# Patient Record
Sex: MALE | Race: WHITE | NOT HISPANIC OR LATINO | Employment: FULL TIME | URBAN - METROPOLITAN AREA
[De-identification: names, ages, dates, MRNs, and addresses within clinical notes are randomized per-mention and may not be internally consistent; named-entity substitution may affect disease eponyms.]

---

## 2017-01-13 ENCOUNTER — OFFICE VISIT (OUTPATIENT)
Dept: FAMILY MEDICINE | Facility: CLINIC | Age: 47
End: 2017-01-13
Payer: COMMERCIAL

## 2017-01-13 VITALS
BODY MASS INDEX: 37.44 KG/M2 | RESPIRATION RATE: 15 BRPM | SYSTOLIC BLOOD PRESSURE: 111 MMHG | OXYGEN SATURATION: 97 % | HEART RATE: 70 BPM | WEIGHT: 225 LBS | DIASTOLIC BLOOD PRESSURE: 74 MMHG | TEMPERATURE: 97 F

## 2017-01-13 DIAGNOSIS — M54.50 ACUTE RIGHT-SIDED LOW BACK PAIN WITHOUT SCIATICA: Primary | ICD-10-CM

## 2017-01-13 LAB

## 2017-01-13 PROCEDURE — 99213 OFFICE O/P EST LOW 20 MIN: CPT | Performed by: NURSE PRACTITIONER

## 2017-01-13 PROCEDURE — 81001 URINALYSIS AUTO W/SCOPE: CPT | Performed by: NURSE PRACTITIONER

## 2017-01-13 RX ORDER — LISINOPRIL 2.5 MG/1
2.5 TABLET ORAL
COMMUNITY
Start: 2016-08-14 | End: 2018-05-02

## 2017-01-13 RX ORDER — NITROGLYCERIN 0.4 MG/1
0.4 TABLET SUBLINGUAL
COMMUNITY
Start: 2016-08-14

## 2017-01-13 RX ORDER — METHOCARBAMOL 750 MG/1
750 TABLET, FILM COATED ORAL 3 TIMES DAILY PRN
Qty: 30 TABLET | Refills: 0 | Status: SHIPPED | OUTPATIENT
Start: 2017-01-13 | End: 2017-05-11

## 2017-01-13 RX ORDER — CLOPIDOGREL BISULFATE 75 MG/1
TABLET ORAL
COMMUNITY
Start: 2017-01-12 | End: 2017-12-27

## 2017-01-13 RX ORDER — ATORVASTATIN CALCIUM 40 MG/1
TABLET, FILM COATED ORAL
COMMUNITY
Start: 2017-01-12

## 2017-01-13 RX ORDER — CARVEDILOL 6.25 MG/1
TABLET ORAL
COMMUNITY
Start: 2017-01-12 | End: 2017-12-27

## 2017-01-13 RX ORDER — ASPIRIN 81 MG/1
81 TABLET, CHEWABLE ORAL
COMMUNITY
Start: 2016-08-14

## 2017-01-13 ASSESSMENT — PAIN SCALES - GENERAL: PAINLEVEL: MODERATE PAIN (5)

## 2017-01-13 NOTE — PATIENT INSTRUCTIONS
Relieving Back Pain  Back pain is a common problem. You can strain back muscles by lifting too much weight or just by moving the wrong way. Back strain can be uncomfortable, even painful. And it can take weeks or months to improve. To help yourself feel better and prevent future back strains, try these tips.  Important Note: Do not give aspirin to children or teens without first discussing it with your healthcare provider.     Ice    Ice reduces muscle pain and swelling. It helps most during the first 24 to 48 hours after an injury.    Wrap an ice pack or a bag of frozen peas in a thin towel. (Never place ice directly on your skin.)    Place the ice where your back hurts the most.    Don t ice for more than 20 minutes at a time.    You can use ice several times a day.     Medicines  Over-the-counter pain relievers can include acetaminophen and anti-inflammatory medicines, which includes aspirin or ibuprofen. They can help ease discomfort. Some also reduce swelling.    Tell your healthcare provider about any medicines you are already taking.    Take medicines only as directed.    Heat  After the first 48 hours, heat can relax sore muscles and improve blood flow.    Try a warm bath or shower. Or use a heating pad set on low. To prevent a burn, keep a cloth between you and the heating pad.    Don t use a heating pad for more than 15 minutes at a time. Never sleep on a heating pad.    2553-5693 [employer]. [address] [place] [address], [place] [address]. All rights reserved. This information is not intended as a substitute for professional medical care. Always follow your healthcare professional's instructions.

## 2017-01-13 NOTE — PROGRESS NOTES
SUBJECTIVE:                                                    Naren Berger is a 46 year old male who presents to clinic today for the following health issues:    Back Pain      Duration: 2 months        Specific cause: none    Description:   Location of pain: low back right and hip right  Character of pain: dull ache, stabbing and gnawing  Pain radiation:radiates into the right leg  New numbness or weakness in legs, not attributed to pain:  no     Intensity: Currently 5/10    History:   Pain interferes with job: YES,   History of back problems: recurrent self limited episodes of low back pain in the past  Any previous MRI or X-rays: None  Sees a specialist for back pain:  No  Therapies tried without relief: chiropractor    Alleviating factors:   Improved by: none    Precipitating factors:  Worsened by: Lifting, Bending, Standing, Sitting and Walking    Functional and Psychosocial Screen (Sita STarT Back):                  Problem list and histories reviewed & adjusted, as indicated.  Additional history: as documented    Problem list, Medication list, Allergies, and Medical/Social/Surgical histories reviewed in EPIC and updated as appropriate.    ROS:  Constitutional, HEENT, cardiovascular, pulmonary, gi and gu systems are negative, except as otherwise noted.    OBJECTIVE:                                                    /74 mmHg  Pulse 70  Temp(Src) 97  F (36.1  C) (Oral)  Resp 15  Wt 225 lb (102.059 kg)  SpO2 97%  Body mass index is 37.44 kg/(m^2).  GENERAL: healthy, alert, well nourished, well hydrated, no distress  RESP: lungs clear to auscultation - no rales, no rhonchi, no wheezes  CV: regular rates and rhythm, normal S1 S2, no S3 or S4 and no murmur, no click or rub -  ABDOMEN: soft, no tenderness, no  hepatosplenomegaly, no masses, normal bowel sounds  Lumber/Thoracic Spine Exam: Tender: right of spine  Range of Motion:  full range of motion raz lower extremities    Strength:  5/5 raz  lower extremities    Special tests:  negative straight leg raises  Hip Exam: Hip ROM full       ASSESSMENT/PLAN:                                                      1. Acute right-sided low back pain without sciatica  - methocarbamol (ROBAXIN) 750 MG tablet; Take 1 tablet (750 mg) by mouth 3 times daily as needed for muscle spasms  Dispense: 30 tablet; Refill: 0    See Patient Instructions  Patient Instructions       Relieving Back Pain  Back pain is a common problem. You can strain back muscles by lifting too much weight or just by moving the wrong way. Back strain can be uncomfortable, even painful. And it can take weeks or months to improve. To help yourself feel better and prevent future back strains, try these tips.  Important Note: Do not give aspirin to children or teens without first discussing it with your healthcare provider.     Ice    Ice reduces muscle pain and swelling. It helps most during the first 24 to 48 hours after an injury.    Wrap an ice pack or a bag of frozen peas in a thin towel. (Never place ice directly on your skin.)    Place the ice where your back hurts the most.    Don t ice for more than 20 minutes at a time.    You can use ice several times a day.     Medicines  Over-the-counter pain relievers can include acetaminophen and anti-inflammatory medicines, which includes aspirin or ibuprofen. They can help ease discomfort. Some also reduce swelling.    Tell your healthcare provider about any medicines you are already taking.    Take medicines only as directed.    Heat  After the first 48 hours, heat can relax sore muscles and improve blood flow.    Try a warm bath or shower. Or use a heating pad set on low. To prevent a burn, keep a cloth between you and the heating pad.    Don t use a heating pad for more than 15 minutes at a time. Never sleep on a heating pad.    2601-3703 [employer]. [address] [place] [address], [place] [address]. All rights reserved. This information is  not intended as a substitute for professional medical care. Always follow your healthcare professional's instructions.              GEORGINA Cabral [facility]

## 2017-01-13 NOTE — NURSING NOTE
"Chief Complaint   Patient presents with     Back Pain     right lower back pain       Initial /74 mmHg  Pulse 70  Temp(Src) 97  F (36.1  C) (Oral)  Resp 15  Wt 225 lb (102.059 kg)  SpO2 97% Estimated body mass index is 37.44 kg/(m^2) as calculated from the following:    Height as of 7/27/16: 5' 5\" (1.651 m).    Weight as of this encounter: 225 lb (102.059 kg).  BP completed using cuff size: gely Bradley MA      "

## 2017-01-13 NOTE — MR AVS SNAPSHOT
After Visit Summary   1/13/2017    Naren Berger    MRN: [id number]           Patient Information     Date Of Birth          1970        Visit Information        Provider Department      1/13/2017 2:00 PM Marium Bowen APRN [facility]        Today's Diagnoses     Acute right-sided low back pain without sciatica    -  1       Care Instructions      Relieving Back Pain  Back pain is a common problem. You can strain back muscles by lifting too much weight or just by moving the wrong way. Back strain can be uncomfortable, even painful. And it can take weeks or months to improve. To help yourself feel better and prevent future back strains, try these tips.  Important Note: Do not give aspirin to children or teens without first discussing it with your healthcare provider.     Ice    Ice reduces muscle pain and swelling. It helps most during the first 24 to 48 hours after an injury.    Wrap an ice pack or a bag of frozen peas in a thin towel. (Never place ice directly on your skin.)    Place the ice where your back hurts the most.    Don t ice for more than 20 minutes at a time.    You can use ice several times a day.     Medicines  Over-the-counter pain relievers can include acetaminophen and anti-inflammatory medicines, which includes aspirin or ibuprofen. They can help ease discomfort. Some also reduce swelling.    Tell your healthcare provider about any medicines you are already taking.    Take medicines only as directed.    Heat  After the first 48 hours, heat can relax sore muscles and improve blood flow.    Try a warm bath or shower. Or use a heating pad set on low. To prevent a burn, keep a cloth between you and the heating pad.    Don t use a heating pad for more than 15 minutes at a time. Never sleep on a heating pad.    8131-7082 [employer]. [address] [place] [address], [place] [address]. All rights reserved. This information is not intended as a substitute  "for professional medical care. Always follow your healthcare professional's instructions.              Follow-ups after your visit        Who to contact     If you have questions or need follow up information about today's clinic visit or your schedule please contact [facility] directly at [phone].  Normal or non-critical lab and imaging results will be communicated to you by MyChart, letter or phone within 4 business days after the clinic has received the results. If you do not hear from us within 7 days, please contact the clinic through MyChart or phone. If you have a critical or abnormal lab result, we will notify you by phone as soon as possible.  Submit refill requests through [employer] or call your pharmacy and they will forward the refill request to us. Please allow 3 business days for your refill to be completed.          Additional Information About Your Visit        MyChart Information     [employer] lets you send messages to your doctor, view your test results, renew your prescriptions, schedule appointments and more. To sign up, go to [email or web address] . Click on \"Log in\" on the left side of the screen, which will take you to the Welcome page. Then click on \"Sign up Now\" on the right side of the page.     You will be asked to enter the access code listed below, as well as some personal information. Please follow the directions to create your username and password.     Your access code is: [email or web address]  Expires: 2017  3:17 PM     Your access code will  in 90 days. If you need help or a new code, please call your [facility] or [phone].        Care EveryWhere ID     This is your Care EveryWhere ID. This could be used by other organizations to access your [facility] medical records  [id number]        Your Vitals Were     Pulse Temperature Respirations Pulse Oximetry          70 97  F (36.1  C) (Oral) 15 97%         Blood Pressure from Last 3 Encounters:   17 " 111/74   10/09/15 138/88   08/03/15 119/73    Weight from Last 3 Encounters:   01/13/17 225 lb (102.059 kg)   07/27/16 218 lb (98.884 kg)   11/12/15 207 lb (93.895 kg)              We Performed the Following     *UA reflex to Microscopic and Culture ([facility] (except [place] and [place])     Urine Microscopic          Today's Medication Changes          These changes are accurate as of: 1/13/17  3:17 PM.  If you have any questions, ask your nurse or doctor.               Start taking these medicines.        Dose/Directions    methocarbamol 750 MG tablet   Commonly known as:  ROBAXIN   Used for:  Acute right-sided low back pain without sciatica   Started by:  Marium Bowen APRN CNP        Dose:  750 mg   Take 1 tablet (750 mg) by mouth 3 times daily as needed for muscle spasms   Quantity:  30 tablet   Refills:  0            Where to get your medicines      These medications were sent to [place] [address] [place] [facility] [address] [facility]  [address] [facility] [address]     Phone:  [phone]    - methocarbamol 750 MG tablet             Primary Care Provider Office Phone # Fax #    Kristen M Kehr, PA-C [phone]       [facility] [address] [facility] [address]        Thank you!     Thank you for choosing [facility]  for your care. Our goal is always to provide you with excellent care. Hearing back from our patients is one way we can continue to improve our services. Please take a few minutes to complete the written survey that you may receive in the mail after your visit with us. Thank you!             Your Updated Medication List - Protect others around you: Learn how to safely use, store and throw away your medicines at www.disposemymeds.org.          This list is accurate as of: 1/13/17  3:17 PM.  Always use your most recent med list.                   Brand Name Dispense Instructions for use    aspirin  81 MG chewable tablet      81 mg       atorvastatin 40 MG tablet    LIPITOR         carvedilol 6.25 MG tablet    COREG         clopidogrel 75 MG tablet    PLAVIX         cyclobenzaprine 10 MG tablet    FLEXERIL    30 tablet    Take 1 tablet (10 mg) by mouth 3 times daily as needed for muscle spasms       lisinopril 2.5 MG tablet    PRINIVIL/Zestril     Take 2.5 mg by mouth       methocarbamol 750 MG tablet    ROBAXIN    30 tablet    Take 1 tablet (750 mg) by mouth 3 times daily as needed for muscle spasms       NITROSTAT 0.4 MG sublingual tablet   Generic drug:  nitroglycerin      Place 0.4 mg under the tongue       NO ACTIVE MEDICATIONS      .

## 2017-04-14 ENCOUNTER — TRANSFERRED RECORDS (OUTPATIENT)
Dept: HEALTH INFORMATION MANAGEMENT | Facility: CLINIC | Age: 47
End: 2017-04-14

## 2017-05-11 DIAGNOSIS — M54.50 ACUTE RIGHT-SIDED LOW BACK PAIN WITHOUT SCIATICA: ICD-10-CM

## 2017-05-11 RX ORDER — METHOCARBAMOL 750 MG/1
750 TABLET, FILM COATED ORAL 3 TIMES DAILY PRN
Qty: 30 TABLET | Refills: 0 | Status: SHIPPED | OUTPATIENT
Start: 2017-05-11 | End: 2021-04-20

## 2017-07-19 NOTE — PROGRESS NOTES
[facility]  [address] [facility] [address]  [phone]  Dept: [phone]    PRE-OP EVALUATION:  Today's date: 2017    Naren Berger (: 1970) presents for pre-operative evaluation assessment as requested by Dr. Oracio Lema.  He requires evaluation and anesthesia risk assessment prior to undergoing surgery/procedure for treatment of bilateral bilateral carpal tunnel and trigger release. Right hand then left hand a couple weeks later .  Proposed procedure: carpal tunnel release     Date of Surgery/ Procedure: 17  Time of Surgery/ Procedure: unknown   Hospital/Surgical Facility: [facility]  Fax number for surgical facility:   Primary Physician: Richa West [facility]  Type of Anesthesia Anticipated: General      1. YES - Do you have a history of heart attack, stroke, stent, bypass or surgery on an artery in the head, neck, heart or legs?  2. NO - Do you ever have any pain or discomfort in your chest?  3. NO - Do you have a history of  Heart Failure?  4. NO - Are you troubled by shortness of breath when: walking on the level, up a slight hill or at night?  5. NO - Do you currently have a cold, bronchitis or other respiratory infection?  6. NO - Do you have a cough, shortness of breath or wheezing?  7. NO - Do you sometimes get pains in the calves of your legs when you walk?  8. NO - Do you or anyone in your family have previous history of blood clots?  9. NO - Do you or does anyone in your family have a serious bleeding problem such as prolonged bleeding following surgeries or cuts?  10. NO - Have you ever had problems with anemia or been told to take iron pills?  11. NO - Have you had any abnormal blood loss such as black, tarry or bloody stools, or abnormal vaginal bleeding?  12. NO - Have you ever had a blood transfusion?  13. YES - Have you or any of your relatives ever had problems with anesthesia?  14. NO - Do you have sleep apnea, excessive snoring  or daytime drowsiness?  15. NO - Do you have any prosthetic heart valves?  16. NO - Do you have prosthetic joints?  17. NO - Is there any chance that you may be pregnant?        HPI:                                                      Brief HPI related to upcoming procedure: history of numbness in left hand over 6 months.     See problem list for active medical problems.  Problems all longstanding and stable, except as noted/documented.  See ROS for pertinent symptoms related to these conditions.                                                                                                  .    MEDICAL HISTORY:                                                    Patient Active Problem List    Diagnosis Date Noted     Coronary artery disease involving native heart with angina pectoris, unspecified vessel or lesion type (H) 07/20/2017     Priority: Medium     Bilateral carpal tunnel syndrome 07/20/2017     Priority: Medium     Bilateral low back pain without sciatica 10/09/2015     Priority: Medium     Trigger thumb of left hand 07/22/2013     Priority: Medium     CARDIOVASCULAR SCREENING; LDL GOAL LESS THAN 160 10/31/2010     Priority: Medium      Past Medical History:   Diagnosis Date     NO ACTIVE PROBLEMS      Past Surgical History:   Procedure Laterality Date     C ANESTH,ARTHROSCOP PROC,ANKLE &/OR FOOT      right     PVD STENTING  08/2016     Current Outpatient Prescriptions   Medication Sig Dispense Refill     carvedilol (COREG) 6.25 MG tablet        aspirin 81 MG chewable tablet 81 mg       lisinopril (PRINIVIL/ZESTRIL) 2.5 MG tablet Take 2.5 mg by mouth       clopidogrel (PLAVIX) 75 MG tablet        nitroglycerin (NITROSTAT) 0.4 MG sublingual tablet Place 0.4 mg under the tongue       atorvastatin (LIPITOR) 40 MG tablet        methocarbamol (ROBAXIN) 750 MG tablet Take 1 tablet (750 mg) by mouth 3 times daily as needed for muscle spasms (Patient not taking: Reported on 7/20/2017) 30 tablet 0     OTC  "products: None, except as noted above    Allergies   Allergen Reactions     No Known Allergies       Latex Allergy: NO    Social History   Substance Use Topics     Smoking status: Former Smoker     Packs/day: 0.50     Years: 20.00     Types: Cigarettes     Smokeless tobacco: Never Used     Alcohol use No     History   Drug Use No       REVIEW OF SYSTEMS:                                                    C: NEGATIVE for fever, chills, change in weight  I: NEGATIVE for worrisome rashes, moles or lesions  E: NEGATIVE for vision changes or irritation  E/M: NEGATIVE for ear, mouth and throat problems  R: NEGATIVE for significant cough or SOB  B: NEGATIVE for masses, tenderness or discharge  CV: NEGATIVE for chest pain, palpitations or peripheral edema  GI: NEGATIVE for nausea, abdominal pain, heartburn, or change in bowel habits  : NEGATIVE for frequency, dysuria, or hematuria  M: NEGATIVE for significant arthralgias or myalgia  N: NEGATIVE for weakness, dizziness or paresthesias  E: NEGATIVE for temperature intolerance, skin/hair changes  H: NEGATIVE for bleeding problems  P: NEGATIVE for changes in mood or affect    EXAM:                                                    /79  Pulse 78  Ht 5' 5\" (1.651 m)  Wt 225 lb (102.1 kg)  SpO2 96%  BMI 37.44 kg/m2    GENERAL APPEARANCE: healthy, alert and no distress     EYES: EOMI,  PERRL     HENT: ear canals and TM's normal and nose and mouth without ulcers or lesions     NECK: no adenopathy, no asymmetry, masses, or scars and thyroid normal to palpation     RESP: lungs clear to auscultation - no rales, rhonchi or wheezes     CV: regular rates and rhythm, normal S1 S2, no S3 or S4 and no murmur, click or rub     ABDOMEN:  soft, nontender, no HSM or masses and bowel sounds normal     MS: extremities normal- no gross deformities noted, no evidence of inflammation in joints, FROM in all extremities.     SKIN: no suspicious lesions or rashes     NEURO: Normal strength " and tone, sensory exam grossly normal, mentation intact and speech normal     PSYCH: mentation appears normal. and affect normal/bright     LYMPHATICS: No axillary, cervical, or supraclavicular nodes    DIAGNOSTICS:                                                      Labs Drawn and in Process:   Unresulted Labs Ordered in the Past 30 Days of this Admission     Date and Time Order Name Status Description    7/20/2017 1345 BASIC METABOLIC PANEL In process           No results for input(s): HGB, PLT, INR, NA, POTASSIUM, CR, A1C in the last 25632 hours.     IMPRESSION:                                                    Reason for surgery/procedure: Right hand then left hand a couple weeks later .  Proposed procedure: carpal tunnel release     The proposed surgical procedure is considered LOW risk.    REVISED CARDIAC RISK INDEX  The patient has the following serious cardiovascular risks for perioperative complications such as (MI, PE, VFib and 3  AV Block):  Coronary Artery Disease (MI, positive stress test, angina, Qs on EKG)      The patient has the following additional risks for perioperative complications:  No identified additional risks      ICD-10-CM    1. Preop general physical exam Z01.818 Hemoglobin     Basic metabolic panel   2. Coronary artery disease involving native heart with angina pectoris, unspecified vessel or lesion type (H) I25.119    3. Bilateral carpal tunnel syndrome G56.03        RECOMMENDATIONS:                                                        APPROVAL GIVEN to proceed with proposed procedure, without further diagnostic evaluation       Signed Electronically by: Louis Colon PA-C    Copy of this evaluation report is provided to requesting physician.    [facility] Preop Guidelines  I agree with the above plan  Oracio Fletcher MD

## 2017-07-20 ENCOUNTER — OFFICE VISIT (OUTPATIENT)
Dept: FAMILY MEDICINE | Facility: CLINIC | Age: 47
End: 2017-07-20
Payer: COMMERCIAL

## 2017-07-20 VITALS
HEIGHT: 65 IN | DIASTOLIC BLOOD PRESSURE: 79 MMHG | WEIGHT: 225 LBS | SYSTOLIC BLOOD PRESSURE: 118 MMHG | BODY MASS INDEX: 37.49 KG/M2 | OXYGEN SATURATION: 96 % | HEART RATE: 78 BPM

## 2017-07-20 DIAGNOSIS — G56.03 BILATERAL CARPAL TUNNEL SYNDROME: ICD-10-CM

## 2017-07-20 DIAGNOSIS — Z01.818 PREOP GENERAL PHYSICAL EXAM: Primary | ICD-10-CM

## 2017-07-20 DIAGNOSIS — I25.119 CORONARY ARTERY DISEASE INVOLVING NATIVE HEART WITH ANGINA PECTORIS, UNSPECIFIED VESSEL OR LESION TYPE (H): ICD-10-CM

## 2017-07-20 PROBLEM — G56.02 CARPAL TUNNEL SYNDROME OF LEFT WRIST: Status: ACTIVE | Noted: 2017-07-20

## 2017-07-20 PROBLEM — G56.02 CARPAL TUNNEL SYNDROME OF LEFT WRIST: Status: RESOLVED | Noted: 2017-07-20 | Resolved: 2017-07-20

## 2017-07-20 LAB
ANION GAP SERPL CALCULATED.3IONS-SCNC: 7 MMOL/L (ref 3–14)
BUN SERPL-MCNC: 7 MG/DL (ref 7–30)
CALCIUM SERPL-MCNC: 8.9 MG/DL (ref 8.5–10.1)
CHLORIDE SERPL-SCNC: 104 MMOL/L (ref 94–109)
CO2 SERPL-SCNC: 28 MMOL/L (ref 20–32)
CREAT SERPL-MCNC: 0.85 MG/DL (ref 0.66–1.25)
GFR SERPL CREATININE-BSD FRML MDRD: ABNORMAL ML/MIN/1.7M2
GLUCOSE SERPL-MCNC: 108 MG/DL (ref 70–99)
HGB BLD-MCNC: 14.8 G/DL (ref 13.3–17.7)
POTASSIUM SERPL-SCNC: 3.6 MMOL/L (ref 3.4–5.3)
SODIUM SERPL-SCNC: 139 MMOL/L (ref 133–144)

## 2017-07-20 PROCEDURE — 85018 HEMOGLOBIN: CPT | Performed by: PHYSICIAN ASSISTANT

## 2017-07-20 PROCEDURE — 99214 OFFICE O/P EST MOD 30 MIN: CPT | Performed by: PHYSICIAN ASSISTANT

## 2017-07-20 PROCEDURE — 36415 COLL VENOUS BLD VENIPUNCTURE: CPT | Performed by: PHYSICIAN ASSISTANT

## 2017-07-20 PROCEDURE — 80048 BASIC METABOLIC PNL TOTAL CA: CPT | Performed by: PHYSICIAN ASSISTANT

## 2017-07-20 NOTE — LETTER
[facility]  [address] [facility] [address] [phone]        July 21, 2017    Naren Berger  [address] [facility] [address]            Mr. Berger,     All of your labs were normal for you.     Please contact the clinic if you have additional questions.  Thank you.     Sincerely,     Louis Colon PA-C/ks     Results for orders placed or performed in visit on 07/20/17   Hemoglobin   Result Value Ref Range    Hemoglobin 14.8 13.3 - 17.7 g/dL   Basic metabolic panel   Result Value Ref Range    Sodium 139 133 - 144 mmol/L    Potassium 3.6 3.4 - 5.3 mmol/L    Chloride 104 94 - 109 mmol/L    Carbon Dioxide 28 20 - 32 mmol/L    Anion Gap 7 3 - 14 mmol/L    Glucose 108 (H) 70 - 99 mg/dL    Urea Nitrogen 7 7 - 30 mg/dL    Creatinine 0.85 0.66 - 1.25 mg/dL    GFR Estimate >90  Non  GFR Calc   >60 mL/min/1.7m2    GFR Estimate If Black >90   GFR Calc   >60 mL/min/1.7m2    Calcium 8.9 8.5 - 10.1 mg/dL

## 2017-07-20 NOTE — NURSING NOTE
"Chief Complaint   Patient presents with     Pre-Op Exam       Initial /79  Pulse 78  Ht 5' 5\" (1.651 m)  Wt 225 lb (102.1 kg)  SpO2 96%  BMI 37.44 kg/m2 Estimated body mass index is 37.44 kg/(m^2) as calculated from the following:    Height as of this encounter: 5' 5\" (1.651 m).    Weight as of this encounter: 225 lb (102.1 kg).  Medication Reconciliation: complete  Елена Pantoja CMA    "

## 2017-07-20 NOTE — MR AVS SNAPSHOT
After Visit Summary   7/20/2017    Naren Berger    MRN: [id number]           Patient Information     Date Of Birth          1970        Visit Information        Provider Department      7/20/2017 1:30 PM Louis Colon PA-C [facility]        Today's Diagnoses     Preop general physical exam    -  1    Coronary artery disease involving native heart with angina pectoris, unspecified vessel or lesion type (H)        Bilateral carpal tunnel syndrome          Care Instructions      Before Your Surgery      Call your surgeon if there is any change in your health. This includes signs of a cold or flu (such as a sore throat, runny nose, cough, rash or fever).    Do not smoke, drink alcohol or take over the counter medicine (unless your surgeon or primary care doctor tells you to) for the 24 hours before and after surgery.    If you take prescribed drugs: Follow your doctor s orders about which medicines to take and which to stop until after surgery.    Eating and drinking prior to surgery: follow the instructions from your surgeon    Take a shower or bath the night before surgery. Use the soap your surgeon gave you to gently clean your skin. If you do not have soap from your surgeon, use your regular soap. Do not shave or scrub the surgery site.  Wear clean pajamas and have clean sheets on your bed.           Follow-ups after your visit        Who to contact     If you have questions or need follow up information about today's clinic visit or your schedule please contact [facility] directly at [phone].  Normal or non-critical lab and imaging results will be communicated to you by MyChart, letter or phone within 4 business days after the clinic has received the results. If you do not hear from us within 7 days, please contact the clinic through MyChart or phone. If you have a critical or abnormal lab result, we will notify you by phone as soon as  "possible.  Submit refill requests through [employer] or call your pharmacy and they will forward the refill request to us. Please allow 3 business days for your refill to be completed.          Additional Information About Your Visit        [employer] Information     Nvigen lets you send messages to your doctor, view your test results, renew your prescriptions, schedule appointments and more. To sign up, go to [email or web address] . Click on \"Log in\" on the left side of the screen, which will take you to the Welcome page. Then click on \"Sign up Now\" on the right side of the page.     You will be asked to enter the access code listed below, as well as some personal information. Please follow the directions to create your username and password.     Your access code is: [email or web address]  Expires: 10/18/2017  2:00 PM     Your access code will  in 90 days. If you need help or a new code, please call your [facility] clinic or [phone].        Care EveryWhere ID     This is your Care EveryWhere ID. This could be used by other organizations to access your [facility] medical records  [id number]        Your Vitals Were     Pulse Height Pulse Oximetry BMI (Body Mass Index)          78 5' 5\" (1.651 m) 96% 37.44 kg/m2         Blood Pressure from Last 3 Encounters:   17 118/79   17 111/74   10/09/15 138/88    Weight from Last 3 Encounters:   17 225 lb (102.1 kg)   17 225 lb (102.1 kg)   16 218 lb (98.9 kg)              We Performed the Following     Basic metabolic panel     Hemoglobin        Primary Care Provider    Md Other Clinic                Equal Access to Services     [facility]: Hadii heidi Goins, sj riley, qabacilio marcus . So [facility] [phone].    ATENCIÓN: Si habla español, tiene a salgado disposición servicios gratuitos de asistencia lingüística. Beto al [phone].    We comply with applicable federal civil rights " laws and [place] laws. We do not discriminate on the basis of race, color, national origin, age, disability sex, sexual orientation or gender identity.            Thank you!     [facility] you for [facility]  [facility]. Our goal is always to provide you with excellent care. Hearing back from our patients is one way we can continue to improve our services. Please take a few minutes to complete the written survey that you may receive in the mail after your visit with us. Thank you!             Your Updated Medication List - Protect others around you: Learn how to safely use, store and throw away your medicines at www.disposemymeds.org.          This list is accurate as of: 7/20/17  2:00 PM.  Always use your most recent med list.                   Brand Name Dispense Instructions for use Diagnosis    aspirin 81 MG chewable tablet      81 mg        atorvastatin 40 MG tablet    LIPITOR          carvedilol 6.25 MG tablet    COREG          clopidogrel 75 MG tablet    PLAVIX          lisinopril 2.5 MG tablet    PRINIVIL/Zestril     Take 2.5 mg by mouth        methocarbamol 750 MG tablet    ROBAXIN    30 tablet    Take 1 tablet (750 mg) by mouth 3 times daily as needed for muscle spasms    Acute right-sided low back pain without sciatica       NITROSTAT 0.4 MG sublingual tablet   Generic drug:  nitroGLYcerin      Place 0.4 mg under the tongue

## 2017-07-21 NOTE — PROGRESS NOTES
MrJuany Berger,    All of your labs were normal for you.    Please contact the clinic if you have additional questions.  Thank you.    Sincerely,    Louis Colon PA-C

## 2017-12-27 ENCOUNTER — OFFICE VISIT (OUTPATIENT)
Dept: FAMILY MEDICINE | Facility: CLINIC | Age: 47
End: 2017-12-27
Payer: COMMERCIAL

## 2017-12-27 VITALS
WEIGHT: 230 LBS | DIASTOLIC BLOOD PRESSURE: 84 MMHG | SYSTOLIC BLOOD PRESSURE: 127 MMHG | HEART RATE: 93 BPM | HEIGHT: 65 IN | OXYGEN SATURATION: 98 % | BODY MASS INDEX: 38.32 KG/M2

## 2017-12-27 DIAGNOSIS — L91.8 SKIN TAG: Primary | ICD-10-CM

## 2017-12-27 DIAGNOSIS — I25.119 CORONARY ARTERY DISEASE INVOLVING NATIVE HEART WITH ANGINA PECTORIS, UNSPECIFIED VESSEL OR LESION TYPE (H): ICD-10-CM

## 2017-12-27 DIAGNOSIS — N52.9 ERECTILE DYSFUNCTION, UNSPECIFIED ERECTILE DYSFUNCTION TYPE: ICD-10-CM

## 2017-12-27 DIAGNOSIS — Z12.11 SPECIAL SCREENING FOR MALIGNANT NEOPLASMS, COLON: ICD-10-CM

## 2017-12-27 PROCEDURE — 11200 RMVL SKIN TAGS UP TO&INC 15: CPT | Performed by: PHYSICIAN ASSISTANT

## 2017-12-27 PROCEDURE — 99213 OFFICE O/P EST LOW 20 MIN: CPT | Mod: 25 | Performed by: PHYSICIAN ASSISTANT

## 2017-12-27 RX ORDER — SILDENAFIL CITRATE 20 MG/1
20-100 TABLET ORAL DAILY
Qty: 30 TABLET | Refills: 0 | Status: SHIPPED | OUTPATIENT
Start: 2017-12-27 | End: 2018-05-09

## 2017-12-27 RX ORDER — CARVEDILOL 6.25 MG/1
6.25 TABLET ORAL 2 TIMES DAILY WITH MEALS
Qty: 60 TABLET | COMMUNITY
Start: 2017-12-27 | End: 2018-05-02

## 2017-12-27 NOTE — MR AVS SNAPSHOT
After Visit Summary   12/27/2017    Naren Berger    MRN: [id number]           Patient Information     Date Of Birth          1970        Visit Information        Provider Department      12/27/2017 9:40 AM Louis Colon PA-C [facility] [place]        Today's Diagnoses     Skin tag    -  1    Coronary artery disease involving native heart with angina pectoris, unspecified vessel or lesion type (H)        Special screening for malignant neoplasms, colon           Follow-ups after your visit        Additional Services     GASTROENTEROLOGY ADULT REF PROCEDURE ONLY       Last Lab Result: Creatinine (mg/dL)       Date                     Value                 07/20/2017               0.85             ----------  Body mass index is 38.27 kg/(m^2).      Patient will be contacted to schedule procedure.     Please be aware that coverage of these services is subject to the terms and limitations of your health insurance plan.  Call member services at your health plan with any benefit or coverage questions.  Any procedures must be performed at a [place] facility OR coordinated by your clinic's referral office.    Please bring the following with you to your appointment:    (1) Any X-Rays, CTs or MRIs which have been performed.  Contact the facility where they were done to arrange for  prior to your scheduled appointment.    (2) List of current medications   (3) This referral request   (4) Any documents/labs given to you for this referral                  Future tests that were ordered for you today     Open Future Orders        Priority Expected Expires Ordered    Lipid panel reflex to direct LDL Fasting Routine  6/30/2018 12/27/2017            Who to contact     If you have questions or need follow up information about today's clinic visit or your schedule please contact [facility] directly at [phone].  Normal or non-critical lab and imaging results will be  "communicated to you by MyChart, letter or phone within 4 business days after the clinic has received the results. If you do not hear from us within 7 days, please contact the clinic through [employer] or phone. If you have a critical or abnormal lab result, we will notify you by phone as soon as possible.  Submit refill requests through [employer] or call your pharmacy and they will forward the refill request to us. Please allow 3 business days for your refill to be completed.          Additional Information About Your Visit        [employer] Information     [employer] lets you send messages to your doctor, view your test results, renew your prescriptions, schedule appointments and more. To sign up, go to [email or web address] . Click on \"Log in\" on the left side of the screen, which will take you to the Welcome page. Then click on \"Sign up Now\" on the right side of the page.     You will be asked to enter the access code listed below, as well as some personal information. Please follow the directions to create your username and password.     Your access code is: [email or web address]  Expires: 3/27/2018 10:09 AM     Your access code will  in 90 days. If you need help or a new code, please call your [facility] clinic or [phone].        Care EveryWhere ID     This is your Care EveryWhere ID. This could be used by other organizations to access your [place] medical records  [id number]        Your Vitals Were     Pulse Height Pulse Oximetry BMI (Body Mass Index)          93 5' 5\" (1.651 m) 98% 38.27 kg/m2         Blood Pressure from Last 3 Encounters:   17 127/84   17 118/79   17 111/74    Weight from Last 3 Encounters:   17 230 lb (104.3 kg)   17 225 lb (102.1 kg)   17 225 lb (102.1 kg)              We Performed the Following     GASTROENTEROLOGY ADULT REF PROCEDURE ONLY     REMOVAL OF SKIN TAGS, FIRST 15        Primary Care Provider Fax #    Physician No Ref-Primary [phone]       No " address on file        Equal Access to Services     [facility] : Hadii aad ku juanjo Goins, waave luhaylee, qalefty victor jerrymegannina, bacilio lacey jacobsgregoriasussy magallon. So [facility] [phone].    ATENCIÓN: Si habla español, tiene a salgado disposición servicios gratuitos de asistencia lingüística. Llame al [phone].    We comply with applicable federal civil rights laws and [place] laws. We do not discriminate on the basis of race, color, national origin, age, disability, sex, sexual orientation, or gender identity.            Thank you!     Thank you for choosing [facility]  [facility] your [facility]. Our goal is always to provide you with excellent care. Hearing back from our patients is one way we can continue to improve our services. Please take a few minutes to complete the written survey that you may receive in the mail after your visit with us. Thank you!             Your Updated Medication List - Protect others around you: Learn how to safely use, store and throw away your medicines at www.disposemymeds.org.          This list is accurate as of: 12/27/17 10:09 AM.  Always use your most recent med list.                   Brand Name Dispense Instructions for use Diagnosis    aspirin 81 MG chewable tablet      81 mg        atorvastatin 40 MG tablet    LIPITOR          lisinopril 2.5 MG tablet    PRINIVIL/Zestril     Take 2.5 mg by mouth        methocarbamol 750 MG tablet    ROBAXIN    30 tablet    Take 1 tablet (750 mg) by mouth 3 times daily as needed for muscle spasms    Acute right-sided low back pain without sciatica       NITROSTAT 0.4 MG sublingual tablet   Generic drug:  nitroGLYcerin      Place 0.4 mg under the tongue

## 2017-12-27 NOTE — NURSING NOTE
"Chief Complaint   Patient presents with     Skin Tags       Initial /84  Pulse 93  Ht 5' 5\" (1.651 m)  Wt 230 lb (104.3 kg)  SpO2 98%  BMI 38.27 kg/m2 Estimated body mass index is 38.27 kg/(m^2) as calculated from the following:    Height as of this encounter: 5' 5\" (1.651 m).    Weight as of this encounter: 230 lb (104.3 kg).  Medication Reconciliation: complete  Елена Pantoja CMA    "

## 2017-12-27 NOTE — PROGRESS NOTES
SUBJECTIVE:                                                    Naren Berger is a 47 year old male who presents to clinic today for the following health issues:    Derm Problem      Duration: unsure    Description (location/character/radiation): left armpit area    Intensity:  mild    Accompanying signs and symptoms: pain at times when catching on clothing    History (similar episodes/previous evaluation): None    Precipitating or alleviating factors: None    Therapies tried and outcome: None   Off and on irritation.       Also history of MI in 8/2016. Had a stint placed. Was on plavix and just had 1 yr check up with cardiology 9/2017. Was taken off plavix and told to follow up  With PCP.     Problem list and histories reviewed & adjusted, as indicated.  Additional history: as documented    Patient Active Problem List   Diagnosis     CARDIOVASCULAR SCREENING; LDL GOAL LESS THAN 160     Trigger thumb of left hand     Bilateral low back pain without sciatica     Coronary artery disease involving native heart with angina pectoris, unspecified vessel or lesion type (H)     Bilateral carpal tunnel syndrome     Skin tag     Erectile dysfunction, unspecified erectile dysfunction type     Past Surgical History:   Procedure Laterality Date     C ANESTH,ARTHROSCOP PROC,ANKLE &/OR FOOT      right     PVD STENTING  08/2016       Social History   Substance Use Topics     Smoking status: Former Smoker     Packs/day: 0.50     Years: 20.00     Types: Cigarettes     Smokeless tobacco: Never Used     Alcohol use No     Family History   Problem Relation Age of Onset     DIABETES Father      KIDNEY DISEASE Father          Current Outpatient Prescriptions   Medication Sig Dispense Refill     carvedilol (COREG) 6.25 MG tablet Take 1 tablet (6.25 mg) by mouth 2 times daily (with meals) 60 tablet      sildenafil (REVATIO) 20 MG tablet Take 1-5 tablets ( mg) by mouth daily Take 1 hr prior to sexual activity. Never use with  "nitroglycerin, terazosin or doxazosin. 30 tablet 0     aspirin 81 MG chewable tablet 81 mg       lisinopril (PRINIVIL/ZESTRIL) 2.5 MG tablet Take 2.5 mg by mouth       nitroglycerin (NITROSTAT) 0.4 MG sublingual tablet Place 0.4 mg under the tongue       atorvastatin (LIPITOR) 40 MG tablet        methocarbamol (ROBAXIN) 750 MG tablet Take 1 tablet (750 mg) by mouth 3 times daily as needed for muscle spasms (Patient not taking: Reported on 12/27/2017) 30 tablet 0     [DISCONTINUED] carvedilol (COREG) 6.25 MG tablet        Allergies   Allergen Reactions     No Known Allergies      BP Readings from Last 3 Encounters:   12/27/17 127/84   07/20/17 118/79   01/13/17 111/74    Wt Readings from Last 3 Encounters:   12/27/17 230 lb (104.3 kg)   07/20/17 225 lb (102.1 kg)   01/13/17 225 lb (102.1 kg)                  Labs reviewed in EPIC        ROS:  Constitutional, HEENT, cardiovascular, pulmonary, gi and gu systems are negative, except as otherwise noted.      OBJECTIVE:   /84  Pulse 93  Ht 5' 5\" (1.651 m)  Wt 230 lb (104.3 kg)  SpO2 98%  BMI 38.27 kg/m2  Body mass index is 38.27 kg/(m^2).  GENERAL: healthy, alert and no distress  SKIN: left axilla: 4 small skin tage    Diagnostic Test Results:  none     ASSESSMENT/PLAN:         ICD-10-CM    1. Skin tag L91.8 REMOVAL OF SKIN TAGS, FIRST 15   2. Coronary artery disease involving native heart with angina pectoris, unspecified vessel or lesion type (H) I25.119 Lipid panel reflex to direct LDL Fasting     carvedilol (COREG) 6.25 MG tablet   3. Erectile dysfunction, unspecified erectile dysfunction type N52.9 sildenafil (REVATIO) 20 MG tablet   4. Special screening for malignant neoplasms, colon Z12.11 GASTROENTEROLOGY ADULT REF PROCEDURE ONLY   1. Skin tags are snipped off using sterile #11. Local anesthesia was not used. These pathognomonic lesions are not sent for pathology.  warning signs discussed.  Wound care discussed.   2. Future fasting labs. Recheck blood " pressure in 6 months        Louis Colon PA-C  [facility]

## 2018-03-10 LAB
CHOLEST SERPL-MCNC: 129 MG/DL (ref 100–199)
HDLC SERPL-MCNC: 40 MG/DL
LDLC SERPL CALC-MCNC: 67 MG/DL
NONHDLC SERPL-MCNC: 89 MG/DL
TRIGL SERPL-MCNC: 112 MG/DL

## 2018-05-02 ENCOUNTER — OFFICE VISIT (OUTPATIENT)
Dept: FAMILY MEDICINE | Facility: CLINIC | Age: 48
End: 2018-05-02
Payer: COMMERCIAL

## 2018-05-02 VITALS
SYSTOLIC BLOOD PRESSURE: 128 MMHG | RESPIRATION RATE: 16 BRPM | WEIGHT: 232 LBS | TEMPERATURE: 98.9 F | HEART RATE: 106 BPM | OXYGEN SATURATION: 96 % | BODY MASS INDEX: 38.61 KG/M2 | DIASTOLIC BLOOD PRESSURE: 78 MMHG

## 2018-05-02 DIAGNOSIS — R07.0 THROAT PAIN: ICD-10-CM

## 2018-05-02 DIAGNOSIS — J02.0 STREP THROAT: Primary | ICD-10-CM

## 2018-05-02 LAB
DEPRECATED S PYO AG THROAT QL EIA: ABNORMAL
SPECIMEN SOURCE: ABNORMAL

## 2018-05-02 PROCEDURE — 99213 OFFICE O/P EST LOW 20 MIN: CPT | Performed by: PHYSICIAN ASSISTANT

## 2018-05-02 PROCEDURE — 87880 STREP A ASSAY W/OPTIC: CPT | Performed by: PHYSICIAN ASSISTANT

## 2018-05-02 RX ORDER — AMOXICILLIN 875 MG
875 TABLET ORAL 2 TIMES DAILY
Qty: 20 TABLET | Refills: 0 | Status: SHIPPED | OUTPATIENT
Start: 2018-05-02 | End: 2018-05-22

## 2018-05-02 ASSESSMENT — PAIN SCALES - GENERAL: PAINLEVEL: MODERATE PAIN (4)

## 2018-05-02 NOTE — NURSING NOTE
"Chief Complaint   Patient presents with     Pharyngitis     Health Maintenance     PHQ2       Initial /78  Pulse 106  Temp 98.9  F (37.2  C) (Oral)  Resp 16  Wt 232 lb (105.2 kg)  SpO2 96%  BMI 38.61 kg/m2 Estimated body mass index is 38.61 kg/(m^2) as calculated from the following:    Height as of 12/27/17: 5' 5\" (1.651 m).    Weight as of this encounter: 232 lb (105.2 kg).  Medication Reconciliation: complete  Елена Pantoja CMA    "

## 2018-05-02 NOTE — MR AVS SNAPSHOT
"              After Visit Summary   2018    Naren Berger    MRN: [id number]           Patient Information     Date Of Birth          1970        Visit Information        Provider Department      2018 9:40 AM Louis Colon PA-C [facility]        Today's Diagnoses     Strep throat    -  1    Throat pain           Follow-ups after your visit        Who to contact     If you have questions or need follow up information about today's clinic visit or your schedule please contact [facility] directly at [phone].  Normal or non-critical lab and imaging results will be communicated to you by [employer], letter or phone within 4 business days after the clinic has received the results. If you do not hear from us within 7 days, please contact the clinic through [employer] or phone. If you have a critical or abnormal lab result, we will notify you by phone as soon as possible.  Submit refill requests through [email or web address] or call your pharmacy and they will forward the refill request to us. Please allow 3 business days for your refill to be completed.          Additional Information About Your Visit        MyChart Information     [email or web address] lets you send messages to your doctor, view your test results, renew your prescriptions, schedule appointments and more. To sign up, go to [email or web address] . Click on \"Log in\" on the left side of the screen, which will take you to the Welcome page. Then click on \"Sign up Now\" on the right side of the page.     You will be asked to enter the access code listed below, as well as some personal information. Please follow the directions to create your username and password.     Your access code is: [email or web address]  Expires: 2018 10:07 AM     Your access code will  in 90 days. If you need help or a new code, please call your [facility]] or [phone].        Care EveryWhere ID     This is your Care EveryWhere ID. This could be used by " other organizations to access your [place] medical records  [id number]        Your Vitals Were     Pulse Temperature Respirations Pulse Oximetry BMI (Body Mass Index)       106 98.9  F (37.2  C) (Oral) 16 96% 38.61 kg/m2        Blood Pressure from Last 3 Encounters:   05/02/18 128/78   12/27/17 127/84   07/20/17 118/79    Weight from Last 3 Encounters:   05/02/18 232 lb (105.2 kg)   12/27/17 230 lb (104.3 kg)   07/20/17 225 lb (102.1 kg)              We Performed the Following     Rapid strep screen          Today's Medication Changes          These changes are accurate as of 5/2/18 10:07 AM.  If you have any questions, ask your nurse or doctor.               Start taking these medicines.        Dose/Directions    amoxicillin 875 MG tablet   Commonly known as:  AMOXIL   Used for:  Strep throat   Started by:  Louis Colon PA-C        Dose:  875 mg   Take 1 tablet (875 mg) by mouth 2 times daily   Quantity:  20 tablet   Refills:  0            Where to get your medicines      These medications were sent to [place] [address] [place] [facility] [address] [facility]  [address] [facility] [address]     Phone:  [phone]     amoxicillin 875 MG tablet                Primary Care Provider Office Phone # Fax #    Louis Colon PA-C [phone] [address] [facility] [address]        Equal Access to Services     [facility]: Hadii heidi mcdonaldo Soyumiko, waaxda luqadaha, qaybta kaalmada adeegyada, waxdavid lacey rondon adevalentin magallon. So [facility] [place] [phone].    ATENCIÓN: Si habla español, tiene a salgado disposición servicios gratuitos de asistencia lingüística. Llame al [phone].    We comply with applicable federal civil rights laws and [place] laws. We do not discriminate on the basis of race, color, national origin, age, disability, sex, sexual orientation, or gender identity.            Thank you!     Thank you for choosing [facility]  for  your care. Our goal is always to provide you with excellent care. Hearing back from our patients is one way we can continue to improve our services. Please take a few minutes to complete the written survey that you may receive in the mail after your visit with us. Thank you!             Your Updated Medication List - Protect others around you: Learn how to safely use, store and throw away your medicines at www.disposemymeds.org.          This list is accurate as of 5/2/18 10:07 AM.  Always use your most recent med list.                   Brand Name Dispense Instructions for use Diagnosis    amoxicillin 875 MG tablet    AMOXIL    20 tablet    Take 1 tablet (875 mg) by mouth 2 times daily    Strep throat       aspirin 81 MG chewable tablet      81 mg        atorvastatin 40 MG tablet    LIPITOR          methocarbamol 750 MG tablet    ROBAXIN    30 tablet    Take 1 tablet (750 mg) by mouth 3 times daily as needed for muscle spasms    Acute right-sided low back pain without sciatica       NITROSTAT 0.4 MG sublingual tablet   Generic drug:  nitroGLYcerin      Place 0.4 mg under the tongue        sildenafil 20 MG tablet    REVATIO    30 tablet    Take 1-5 tablets ( mg) by mouth daily Take 1 hr prior to sexual activity. Never use with nitroglycerin, terazosin or doxazosin.    Erectile dysfunction, unspecified erectile dysfunction type

## 2018-05-02 NOTE — PROGRESS NOTES
SUBJECTIVE:                                                    Naren Berger is a 47 year old male who presents to clinic today for the following health issues:    Sore Throat      Duration: 2 days    Description  Sore throat, body aches     Severity: moderate    Accompanying signs and symptoms: None    History (predisposing factors):  strep exposure from child     Precipitating or alleviating factors: None    Therapies tried and outcome:  none      Problem list and histories reviewed & adjusted, as indicated.  Additional history: as documented    Patient Active Problem List   Diagnosis     CARDIOVASCULAR SCREENING; LDL GOAL LESS THAN 160     Trigger thumb of left hand     Bilateral low back pain without sciatica     Coronary artery disease involving native heart with angina pectoris, unspecified vessel or lesion type (H)     Bilateral carpal tunnel syndrome     Skin tag     Erectile dysfunction, unspecified erectile dysfunction type     Past Surgical History:   Procedure Laterality Date     C ANESTH,ARTHROSCOP PROC,ANKLE &/OR FOOT      right     PVD STENTING  08/2016       Social History   Substance Use Topics     Smoking status: Former Smoker     Packs/day: 0.50     Years: 20.00     Types: Cigarettes     Smokeless tobacco: Never Used     Alcohol use No     Family History   Problem Relation Age of Onset     DIABETES Father      KIDNEY DISEASE Father          Current Outpatient Prescriptions   Medication Sig Dispense Refill     amoxicillin (AMOXIL) 875 MG tablet Take 1 tablet (875 mg) by mouth 2 times daily 20 tablet 0     aspirin 81 MG chewable tablet 81 mg       atorvastatin (LIPITOR) 40 MG tablet        methocarbamol (ROBAXIN) 750 MG tablet Take 1 tablet (750 mg) by mouth 3 times daily as needed for muscle spasms 30 tablet 0     nitroglycerin (NITROSTAT) 0.4 MG sublingual tablet Place 0.4 mg under the tongue       sildenafil (REVATIO) 20 MG tablet Take 1-5 tablets ( mg) by mouth daily Take 1 hr prior to  sexual activity. Never use with nitroglycerin, terazosin or doxazosin. 30 tablet 0     Allergies   Allergen Reactions     No Known Allergies      BP Readings from Last 3 Encounters:   05/02/18 128/78   12/27/17 127/84   07/20/17 118/79    Wt Readings from Last 3 Encounters:   05/02/18 232 lb (105.2 kg)   12/27/17 230 lb (104.3 kg)   07/20/17 225 lb (102.1 kg)                  Labs reviewed in EPIC    ROS:  Constitutional, HEENT, cardiovascular, pulmonary, gi and gu systems are negative, except as otherwise noted.    OBJECTIVE:     /78  Pulse 106  Temp 98.9  F (37.2  C) (Oral)  Resp 16  Wt 232 lb (105.2 kg)  SpO2 96%  BMI 38.61 kg/m2  Body mass index is 38.61 kg/(m^2).  GENERAL: healthy, alert and no distress  Head: Normocephalic, atraumatic.  Eyes: Conjunctiva clear, non icteric. PERRLA.  Ears: External ears and TMs normal BL.  Nose: Septum midline, nasal mucosa pink and moist. No discharge.  Mouth / Throat: Normal dentition.  No oral lesions. Pharynx  erythematous, tonsils with hypertrophy. White exudates   Neck: Supple,  enlarged LN, trachea midline.  Heart: S1 and S2 normal, no murmurs, clicks, gallops or rubs. Regular rate and rhythm. Chest: Clear; no wheezes or rales.  The abdomen is soft without tenderness, guarding, mass, rebound or organomegaly. Bowel sounds are normal.    Diagnostic Test Results:  Results for orders placed or performed in visit on 05/02/18 (from the past 24 hour(s))   Rapid strep screen   Result Value Ref Range    Specimen Description Throat     Rapid Strep A Screen (A)      POSITIVE: Group A Streptococcal antigen detected by immunoassay.       ASSESSMENT/PLAN:         ICD-10-CM    1. Strep throat J02.0 amoxicillin (AMOXIL) 875 MG tablet   2. Throat pain R07.0 Rapid strep screen   Warning signs discussed.  side effects discussed  Symptomatic treatment: such as fluids,  OTC acetaminophen and /or non-steroidal anti-inflammatory medication.  Follow up  1-2 wks as needed     Louis  Kenneth Colon PA-C  [facility]

## 2018-05-09 DIAGNOSIS — N52.9 ERECTILE DYSFUNCTION, UNSPECIFIED ERECTILE DYSFUNCTION TYPE: ICD-10-CM

## 2018-05-10 RX ORDER — SILDENAFIL CITRATE 20 MG/1
TABLET ORAL
Qty: 30 TABLET | Refills: 0 | Status: SHIPPED | OUTPATIENT
Start: 2018-05-10 | End: 2018-11-28

## 2018-05-22 ENCOUNTER — OFFICE VISIT (OUTPATIENT)
Dept: FAMILY MEDICINE | Facility: CLINIC | Age: 48
End: 2018-05-22
Payer: COMMERCIAL

## 2018-05-22 VITALS
DIASTOLIC BLOOD PRESSURE: 83 MMHG | WEIGHT: 230 LBS | SYSTOLIC BLOOD PRESSURE: 132 MMHG | TEMPERATURE: 97 F | OXYGEN SATURATION: 96 % | HEART RATE: 81 BPM | BODY MASS INDEX: 38.27 KG/M2 | RESPIRATION RATE: 16 BRPM

## 2018-05-22 DIAGNOSIS — L65.9 HAIR LOSS: ICD-10-CM

## 2018-05-22 DIAGNOSIS — R21 RASH: ICD-10-CM

## 2018-05-22 DIAGNOSIS — L40.9 PSORIASIS: ICD-10-CM

## 2018-05-22 DIAGNOSIS — L42 PITYRIASIS ROSEA: Primary | ICD-10-CM

## 2018-05-22 LAB — TSH SERPL DL<=0.005 MIU/L-ACNC: 1.31 MU/L (ref 0.4–4)

## 2018-05-22 PROCEDURE — 84443 ASSAY THYROID STIM HORMONE: CPT | Performed by: PHYSICIAN ASSISTANT

## 2018-05-22 PROCEDURE — 99213 OFFICE O/P EST LOW 20 MIN: CPT | Performed by: PHYSICIAN ASSISTANT

## 2018-05-22 PROCEDURE — 36415 COLL VENOUS BLD VENIPUNCTURE: CPT | Performed by: PHYSICIAN ASSISTANT

## 2018-05-22 RX ORDER — TRIAMCINOLONE ACETONIDE 5 MG/G
CREAM TOPICAL
Qty: 60 G | Refills: 1 | Status: SHIPPED | OUTPATIENT
Start: 2018-05-22 | End: 2021-04-20

## 2018-05-22 NOTE — PROGRESS NOTES
SUBJECTIVE:                                                    Naren Berger is a 47 year old male who presents to clinic today for the following health issues:    Derm Problem       Duration: 3 weeks     Description (location/character/radiation): red rash on Bilateral arms, legs, torso, back     Intensity:  moderate    Accompanying signs and symptoms: hair falling out     History (similar episodes/previous evaluation): None    Precipitating or alleviating factors: recently was diagnosed with strep and took amoxicillin     Therapies tried and outcome: was using lotion - no relief    Also 2 wks of hair loss. No FM of hair loss. Very concerned.   History for psoriasis. Tx; none.     Problem list and histories reviewed & adjusted, as indicated.  Additional history: as documented    Patient Active Problem List   Diagnosis     CARDIOVASCULAR SCREENING; LDL GOAL LESS THAN 160     Trigger thumb of left hand     Bilateral low back pain without sciatica     Coronary artery disease involving native heart with angina pectoris, unspecified vessel or lesion type (H)     Bilateral carpal tunnel syndrome     Skin tag     Erectile dysfunction, unspecified erectile dysfunction type     Psoriasis     Past Surgical History:   Procedure Laterality Date     C ANESTH,ARTHROSCOP PROC,ANKLE &/OR FOOT      right     PVD STENTING  08/2016       Social History   Substance Use Topics     Smoking status: Former Smoker     Packs/day: 0.50     Years: 20.00     Types: Cigarettes     Smokeless tobacco: Never Used     Alcohol use No     Family History   Problem Relation Age of Onset     DIABETES Father      KIDNEY DISEASE Father          Current Outpatient Prescriptions   Medication Sig Dispense Refill     aspirin 81 MG chewable tablet 81 mg       atorvastatin (LIPITOR) 40 MG tablet        methocarbamol (ROBAXIN) 750 MG tablet Take 1 tablet (750 mg) by mouth 3 times daily as needed for muscle spasms 30 tablet 0     nitroglycerin (NITROSTAT) 0.4  MG sublingual tablet Place 0.4 mg under the tongue       sildenafil (REVATIO) 20 MG tablet TAKE 1-5 TABLETS BY MOUTH 1 HOUR PRIOR TO SEXUAL ACTIVITY.  NEVER TAKE WITH NITROGLYCERIN, TERAZOSIN OR DOXAZOSIN. 30 tablet 0     triamcinolone (KENALOG) 0.5 % cream Apply sparingly to affected area three times daily. 60 g 1     Allergies   Allergen Reactions     No Known Allergies        ROS:  Constitutional, HEENT, cardiovascular, pulmonary, gi and gu systems are negative, except as otherwise noted.    OBJECTIVE:     /83  Pulse 81  Temp 97  F (36.1  C) (Oral)  Resp 16  Wt 230 lb (104.3 kg)  SpO2 96%  BMI 38.27 kg/m2  Body mass index is 38.27 kg/(m^2).  GENERAL: healthy, alert and no distress  SKIN: diffuse small dry scattered lesion. On trunk and extremities.   Dry whitish plaques on raz elbows and knees.     Diagnostic Test Results:  none     ASSESSMENT/PLAN:         ICD-10-CM    1. Pityriasis rosea L42    2. Psoriasis L40.9 DERMATOLOGY REFERRAL     triamcinolone (KENALOG) 0.5 % cream   3. Rash R21 DERMATOLOGY REFERRAL   4. Hair loss L65.9 TSH with free T4 reflex     DERMATOLOGY REFERRAL   1. See Patient Instructions   patient reassurance.   2. Start triamcinolone crm twice a day as needed.   2-4. Follow up  With derm for 2nd opinion.     Louis Colon PA-C  [facility]

## 2018-05-22 NOTE — PATIENT INSTRUCTIONS
"             Pityriasis Rosea  What is pityriasis rosea?  Pityriasis rosea is a skin rash. This rash usually affects people between the ages of 6 and 35 years of age. Usually a healthcare provider needs to examine the rash to diagnose it.  The rash has the following features:  The rash begins with a large, scaly, pink patch on the chest or back, which is called a \"herald\" or \"mother\" patch. It looks like a large ringworm and is 1 to 3 inches across.   A widespread rash of smaller matching spots on both sides of the body occurs 7 to 14 days after the herald patch first appears.   This rash consists of pink, oval-shaped spots that are 1/4 to 1/2 inch across. The spots are covered with fine scales, which give the rash a crinkled appearance.   The rash appears primarily on the chest, abdomen, and back. Often it is worse in the groin and armpits. Usually the rash does not appear on the face.   The rash can be itchy during the first one or two weeks.  What is the cause?  The cause is unknown. It is not caused by a fungus, a bacteria, or an allergy. The rash is probably caused by a virus.  How long does it last?   This condition is harmless. The rash disappears without treatment. The different parts of the rash last from 6 to 10 weeks. During this time your youngster will feel fine.  How is it treated?  Skin creamsTreatment will reduce symptoms. It will not cure pityriasis rosea. If the skin is dry, a moisturizing cream may be helpful. For itchiness, use 1% hydrocortisone cream (no prescription necessary) 2 or 3 times a day. If the rash still itches after using this cream, call your healthcare provider's office for a stronger steroid cream.   Sunlight exposureOne dose of ultraviolet light can stop itching and shorten the course of pityriasis. Have your youngster sunbathe for 30 minutes (enough to make the skin pink). Do this only once. If this is impossible, use a sun lamp or consider a tanning salon once. CAUTION: Avoid " sunburn.   ContagiousnessPityriasis is not contagious. Your child can attend school and take gym.  When should I call the doctor?  Call your child's doctor during office hours if:  The rash becomes very itchy.   The rash becomes infected with pus or draining scabs.   The rash lasts longer than three months.   You have other questions or concerns.    Published by [employer].  This content is reviewed periodically and is subject to change as new health information becomes available. The information is intended to inform and educate and is not a replacement for medical evaluation, advice, diagnosis or treatment by a healthcare professional.

## 2018-05-22 NOTE — NURSING NOTE
"Chief Complaint   Patient presents with     Derm Problem     Health Maintenance     PHQ2       Initial /83  Pulse 81  Temp 97  F (36.1  C) (Oral)  Resp 16  Wt 230 lb (104.3 kg)  SpO2 96%  BMI 38.27 kg/m2 Estimated body mass index is 38.27 kg/(m^2) as calculated from the following:    Height as of 12/27/17: 5' 5\" (1.651 m).    Weight as of this encounter: 230 lb (104.3 kg).  Medication Reconciliation: complete  Елена Pantoja CMA    "

## 2018-05-22 NOTE — LETTER
May 23, 2018    Naren Berger  [address] [facility] [address]        Mr. Berger,    All of your labs were normal for you.    Please contact the clinic if you have additional questions.  Thank you.    Sincerely,    Louis Colon PA-C     Results for orders placed or performed in visit on 05/22/18   TSH with free T4 reflex   Result Value Ref Range    TSH 1.31 0.40 - 4.00 mU/L

## 2018-05-22 NOTE — MR AVS SNAPSHOT
"              After Visit Summary   5/22/2018    Naren Berger    MRN: [id number]           Patient Information     Date Of Birth          1970        Visit Information        Provider Department      5/22/2018 10:40 AM Louis Colon PA-C [facility]        Today's Diagnoses     Pityriasis rosea    -  1    Psoriasis        Rash        Hair loss          Care Instructions                 Pityriasis Rosea  What is pityriasis rosea?  Pityriasis rosea is a skin rash. This rash usually affects people between the ages of 6 and 35 years of age. Usually a healthcare provider needs to examine the rash to diagnose it.  The rash has the following features:  The rash begins with a large, scaly, pink patch on the chest or back, which is called a \"herald\" or \"mother\" patch. It looks like a large ringworm and is 1 to 3 inches across.   A widespread rash of smaller matching spots on both sides of the body occurs 7 to 14 days after the herald patch first appears.   This rash consists of pink, oval-shaped spots that are 1/4 to 1/2 inch across. The spots are covered with fine scales, which give the rash a crinkled appearance.   The rash appears primarily on the chest, abdomen, and back. Often it is worse in the groin and armpits. Usually the rash does not appear on the face.   The rash can be itchy during the first one or two weeks.  What is the cause?  The cause is unknown. It is not caused by a fungus, a bacteria, or an allergy. The rash is probably caused by a virus.  How long does it last?   This condition is harmless. The rash disappears without treatment. The different parts of the rash last from 6 to 10 weeks. During this time your youngster will feel fine.  How is it treated?  Skin creamsTreatment will reduce symptoms. It will not cure pityriasis rosea. If the skin is dry, a moisturizing cream may be helpful. For itchiness, use 1% hydrocortisone cream (no prescription necessary) 2 or 3 times a " day. If the rash still itches after using this cream, call your healthcare provider's office for a stronger steroid cream.   Sunlight exposureOne dose of ultraviolet light can stop itching and shorten the course of pityriasis. Have your youngster sunbathe for 30 minutes (enough to make the skin pink). Do this only once. If this is impossible, use a sun lamp or consider a tanning salon once. CAUTION: Avoid sunburn.   ContagiousnessPityriasis is not contagious. Your child can attend school and take gym.  When should I call the doctor?  Call your child's doctor during office hours if:  The rash becomes very itchy.   The rash becomes infected with pus or draining scabs.   The rash lasts longer than three months.   You have other questions or concerns.    Published by [employer].  This content is reviewed periodically and is subject to change as new health information becomes available. The information is intended to inform and educate and is not a replacement for medical evaluation, advice, diagnosis or treatment by a healthcare professional.              Follow-ups after your visit        Additional Services     DERMATOLOGY REFERRAL       Your provider has referred you to: FM[facility]: [facility] [phone]   [email or web address]  [facility]: [facility] [place] [phone]   [email or web address]  [facility] [phone]   [email or web address]    Please be aware that coverage of these services is subject to the terms and limitations of your health insurance plan.  Call member services at your health plan with any benefit or coverage questions.      Please bring the following with you to your appointment:    (1) Any X-Rays, CTs or MRIs which have been performed.  Contact the facility where they were done to arrange for  prior to your scheduled  "appointment.    (2) List of current medications  (3) This referral request   (4) Any documents/labs given to you for this referral                  Who to contact     If you have questions or need follow up information about today's clinic visit or your schedule please contact [facility] directly at [phone].  Normal or non-critical lab and imaging results will be communicated to you by MyChart, letter or phone within 4 business days after the clinic has received the results. If you do not hear from us within 7 days, please contact the clinic through MyChart or phone. If you have a critical or abnormal lab result, we will notify you by phone as soon as possible.  Submit refill requests through [employer] [place] or call your pharmacy and they will forward the refill request to us. Please allow 3 business days for your refill to be completed.          Additional Information About Your Visit        MyChart Information     [employer] [facility] [place] lets you send messages to your doctor, view your test results, renew your prescriptions, schedule appointments and more. To sign up, go to [email or web address] . Click on \"Log in\" on the left side of the screen, which will take you to the Welcome page. Then click on \"Sign up Now\" on the right side of the page.     You will be asked to enter the access code listed below, as well as some personal information. Please follow the directions to create your username and password.     Your access code is: [email or web address]  Expires: 2018 10:07 AM     Your access code will  in 90 days. If you need help or a new code, please call your [facility] or [phone].        Care EveryWhere ID     This is your Care EveryWhere ID. This could be used by other organizations to access your [place] medical records  [id number]        Your Vitals Were     Pulse Temperature Respirations Pulse Oximetry BMI (Body Mass Index)       81 97  F (36.1  C) (Oral) 16 96% 38.27 kg/m2        Blood " Pressure from Last 3 Encounters:   05/22/18 132/83   05/02/18 128/78   12/27/17 127/84    Weight from Last 3 Encounters:   05/22/18 230 lb (104.3 kg)   05/02/18 232 lb (105.2 kg)   12/27/17 230 lb (104.3 kg)              We Performed the Following     DERMATOLOGY REFERRAL     TSH with free T4 reflex          Today's Medication Changes          These changes are accurate as of 5/22/18 10:56 AM.  If you have any questions, ask your nurse or doctor.               Start taking these medicines.        Dose/Directions    triamcinolone 0.5 % cream   Commonly known as:  KENALOG   Used for:  Psoriasis   Started by:  Louis Colon PA-C        Apply sparingly to affected area three times daily.   Quantity:  60 g   Refills:  1            Where to get your medicines      These medications were sent to [facility] [address], [facility] [address]  [id number] [place] [address], [facility] [address]     Phone:  [phone]     triamcinolone 0.5 % cream                Primary Care Provider Office Phone # Fax #    Louis Colon PA-C [phone] [address] [facility] [address]        Equal Access to Services     KIESHA BERKOWITZ AH: Hadii heidi ku hadasho Soomaali, waaxda luqadaha, qaybta kaalmada adeegyada, waxay lacey haymera magallon. So [facility] [phone].    ATENCIÓN: Si habla español, tiene a salgado disposición servicios gratuitos de asistencia lingüística. Llame al [phone].    We comply with applicable federal civil rights laws and [place] laws. We do not discriminate on the basis of race, color, national origin, age, disability, sex, sexual orientation, or gender identity.            Thank you!     Thank you for choosing [facility]  for your care. Our goal is always to provide you with excellent care. Hearing back from our patients is one way we can continue to improve our services. Please take a few minutes to complete the written survey that  you may receive in the mail after your visit with us. Thank you!             Your Updated Medication List - Protect others around you: Learn how to safely use, store and throw away your medicines at www.disposemymeds.org.          This list is accurate as of 5/22/18 10:56 AM.  Always use your most recent med list.                   Brand Name Dispense Instructions for use Diagnosis    aspirin 81 MG chewable tablet      81 mg        atorvastatin 40 MG tablet    LIPITOR          methocarbamol 750 MG tablet    ROBAXIN    30 tablet    Take 1 tablet (750 mg) by mouth 3 times daily as needed for muscle spasms    Acute right-sided low back pain without sciatica       NITROSTAT 0.4 MG sublingual tablet   Generic drug:  nitroGLYcerin      Place 0.4 mg under the tongue        sildenafil 20 MG tablet    REVATIO    30 tablet    TAKE 1-5 TABLETS BY MOUTH 1 HOUR PRIOR TO SEXUAL ACTIVITY.  NEVER TAKE WITH NITROGLYCERIN, TERAZOSIN OR DOXAZOSIN.    Erectile dysfunction, unspecified erectile dysfunction type       triamcinolone 0.5 % cream    KENALOG    60 g    Apply sparingly to affected area three times daily.    Psoriasis

## 2018-08-10 ENCOUNTER — DOCUMENTATION ONLY (OUTPATIENT)
Dept: LAB | Facility: CLINIC | Age: 48
End: 2018-08-10

## 2018-08-10 NOTE — PROGRESS NOTES
Called and left message to call back if still wasn't labs to be done @[phone] Arabella Steinberg TC

## 2018-08-10 NOTE — PROGRESS NOTES
This patient was sent an over due labs letter on 2018, but has not made a lab only appt. The test is now  and has been canceled. If you want him to return to clinic for a lab only appt, please have your team contact him and renter a new future order.    Thank you, Dixie Meza MLT

## 2018-11-28 ENCOUNTER — TRANSFERRED RECORDS (OUTPATIENT)
Dept: HEALTH INFORMATION MANAGEMENT | Facility: CLINIC | Age: 48
End: 2018-11-28

## 2018-11-28 DIAGNOSIS — N52.9 ERECTILE DYSFUNCTION, UNSPECIFIED ERECTILE DYSFUNCTION TYPE: ICD-10-CM

## 2018-11-28 LAB
CREAT SERPL-MCNC: 0.96 MG/DL (ref 0.72–1.25)
GFR SERPL CREATININE-BSD FRML MDRD: >60 ML/MIN/1.73M2
GLUCOSE SERPL-MCNC: 97 MG/DL (ref 65–100)
POTASSIUM SERPL-SCNC: 4.2 MMOL/L (ref 3.5–5)

## 2018-11-28 RX ORDER — SILDENAFIL CITRATE 20 MG/1
TABLET ORAL
Qty: 30 TABLET | Refills: 0 | Status: SHIPPED | OUTPATIENT
Start: 2018-11-28 | End: 2019-10-10

## 2018-11-28 NOTE — TELEPHONE ENCOUNTER
SILDENAFIL CITRATE 20MG TABS  Will file in chart as: sildenafil (REVATIO) 20 MG tablet  TAKE ONE TO FIVE TABLETS BY MOUTH DAILY 1 HOUR PRIOR TO SEXUAL ACTIVITY. (DO NOT TAKE WITH NITROGLYCERIN, TERAZOSIN, OR DOXAZOSIN)       Disp: 30 tablet Refills: 0    Class: E-Prescribe Start: 11/28/2018   For: Erectile dysfunction, unspecified erectile dysfunction type  Originally ordered: 11 months ago by Louis Colon PA-C  Last refill:5/10/2018  Prior authorization request will be sent when the order is signed.  Erectile Dysfuction Protocol Zpicuw62/28 1:12 PM   Absence of nitrates on medication list     RN not able to fill; nitroglycerin is historical on med list.

## 2019-02-10 ENCOUNTER — OFFICE VISIT (OUTPATIENT)
Dept: URGENT CARE | Facility: URGENT CARE | Age: 49
End: 2019-02-10
Payer: COMMERCIAL

## 2019-02-10 VITALS
DIASTOLIC BLOOD PRESSURE: 81 MMHG | BODY MASS INDEX: 40.32 KG/M2 | HEIGHT: 65 IN | WEIGHT: 242 LBS | TEMPERATURE: 99.2 F | OXYGEN SATURATION: 96 % | SYSTOLIC BLOOD PRESSURE: 137 MMHG | HEART RATE: 89 BPM

## 2019-02-10 DIAGNOSIS — R07.0 THROAT PAIN: ICD-10-CM

## 2019-02-10 DIAGNOSIS — J02.0 STREPTOCOCCAL PHARYNGITIS: Primary | ICD-10-CM

## 2019-02-10 LAB
DEPRECATED S PYO AG THROAT QL EIA: ABNORMAL
SPECIMEN SOURCE: ABNORMAL

## 2019-02-10 PROCEDURE — 99213 OFFICE O/P EST LOW 20 MIN: CPT | Performed by: NURSE PRACTITIONER

## 2019-02-10 PROCEDURE — 87880 STREP A ASSAY W/OPTIC: CPT | Performed by: NURSE PRACTITIONER

## 2019-02-10 RX ORDER — AMOXICILLIN 500 MG/1
500 CAPSULE ORAL 2 TIMES DAILY
Qty: 20 CAPSULE | Refills: 0 | Status: SHIPPED | OUTPATIENT
Start: 2019-02-10 | End: 2020-02-29

## 2019-02-10 ASSESSMENT — ENCOUNTER SYMPTOMS
APPETITE CHANGE: 1
NAUSEA: 0
FATIGUE: 1
CHILLS: 1
MYALGIAS: 1
VOMITING: 0
FEVER: 0
SORE THROAT: 1
HEADACHES: 0
ACTIVITY CHANGE: 1
DIARRHEA: 0

## 2019-02-10 ASSESSMENT — MIFFLIN-ST. JEOR: SCORE: 1884.58

## 2019-02-10 NOTE — PROGRESS NOTES
SUBJECTIVE:   Naren Berger is a 48 year old male presenting with a chief complaint of   Chief Complaint   Patient presents with     Pharyngitis     started feel fatigue a few days ago, sore throat started this am        He is an established patient of [facility].    URI Adult    Onset of symptoms was 2 days ago.  Course of illness is worsening.    Current and Associated symptoms: sore throat and fatigue  Treatment measures tried include Tylenol/Ibuprofen.  Predisposing factors include ill contact: .  Reports increased fatigue, decreased appetite; however no changes in sleep, bowel or bladder habits.       Review of Systems   Constitutional: Positive for activity change, appetite change, chills and fatigue. Negative for fever.   HENT: Positive for sore throat.    Gastrointestinal: Negative for diarrhea, nausea and vomiting.   Genitourinary: Negative for enuresis.   Musculoskeletal: Positive for myalgias.   Skin: Negative.    Neurological: Negative for headaches.   All other systems reviewed and are negative.      Past Medical History:   Diagnosis Date     CAD (coronary artery disease)     MI 2016     NO ACTIVE PROBLEMS      Family History   Problem Relation Age of Onset     Diabetes Father      Kidney Disease Father      Current Outpatient Medications   Medication Sig Dispense Refill     amoxicillin (AMOXIL) 500 MG capsule Take 1 capsule (500 mg) by mouth 2 times daily 20 capsule 0     aspirin 81 MG chewable tablet 81 mg       atorvastatin (LIPITOR) 40 MG tablet        methocarbamol (ROBAXIN) 750 MG tablet Take 1 tablet (750 mg) by mouth 3 times daily as needed for muscle spasms 30 tablet 0     nitroglycerin (NITROSTAT) 0.4 MG sublingual tablet Place 0.4 mg under the tongue       sildenafil (REVATIO) 20 MG tablet TAKE ONE TO FIVE TABLETS BY MOUTH DAILY 1 HOUR PRIOR TO SEXUAL ACTIVITY. (DO NOT TAKE WITH NITROGLYCERIN, TERAZOSIN, OR DOXAZOSIN) 30 tablet 0     triamcinolone (KENALOG) 0.5 % cream Apply  "sparingly to affected area three times daily. 60 g 1     Social History     Tobacco Use     Smoking status: Former Smoker     Packs/day: 0.50     Years: 20.00     Pack years: 10.00     Types: Cigarettes     Smokeless tobacco: Never Used   Substance Use Topics     Alcohol use: No       OBJECTIVE  /81   Pulse 89   Temp 99.2  F (37.3  C) (Oral)   Ht 1.651 m (5' 5\")   Wt 109.8 kg (242 lb)   SpO2 96%   BMI 40.27 kg/m      Physical Exam   Constitutional: He is oriented to person, place, and time. No distress.   HENT:   Right Ear: External ear normal.   Left Ear: External ear normal.   Mouth/Throat: Oropharyngeal exudate present.   Neck: Neck supple.   Cardiovascular: Normal rate, regular rhythm, normal heart sounds and intact distal pulses.   No murmur heard.  Pulmonary/Chest: Effort normal and breath sounds normal. No respiratory distress. He has no wheezes.   Lymphadenopathy:     He has cervical adenopathy.   Neurological: He is alert and oriented to person, place, and time.   Skin: Skin is warm. Capillary refill takes less than 2 seconds. No rash noted.   Psychiatric: He has a normal mood and affect.       Labs:  Results for orders placed or performed in visit on 02/10/19 (from the past 24 hour(s))   Strep, Rapid Screen   Result Value Ref Range    Specimen Description Throat     Rapid Strep A Screen (A)      POSITIVE: Group A Streptococcal antigen detected by immunoassay.       ASSESSMENT:      ICD-10-CM    1. Streptococcal pharyngitis J02.0 amoxicillin (AMOXIL) 500 MG capsule   2. Throat pain R07.0 Strep, Rapid Screen        Medical Decision Making:    Differential Diagnosis:  URI Adult/Peds:  Strep pharyngitis, Viral pharyngitis, Viral syndrome and Viral upper respiratory illness    Serious Comorbid Conditions:  Adult:  None    PLAN: Discussed with parent and patient causes, treatment options for throat infections. Discussed the importance of antibiotic course completion. Education provided regarding " strep infection and advised to change toothbrush in 3 days to avoid reinfection. Follow up with PCP in 3 days or sooner if not improved or symptoms worsen. Patient agreed to the plan of care.     GEORGINA Charles, CNP      Patient Instructions     Patient Education     Pharyngitis: Strep (Confirmed)    You have had a positive test for strep throat. Strep throat is a contagious illness. It is spread by coughing, kissing or by touching others after touching your mouth or nose. Symptoms include throat pain that is worse with swallowing, aching all over, headache, and fever. It is treated with antibiotic medicine. This should help you start to feel better in 1 to 2 days.  Home care    Rest at home. Drink plenty of fluids to you won't get dehydrated.    No work or school for the first 2 days of taking the antibiotics. After this time, you will not be contagious. You can then return to school or work if you are feeling better.     Take antibiotic medicine for the full 10 days, even if you feel better. This is very important to ensure the infection is treated. It is also important to prevent medicine-resistant germs from developing. If you were given an antibiotic shot, you don't need any more antibiotics.    You may use acetaminophen or ibuprofen to control pain or fever, unless another medicine was prescribed for this. Talk with your healthcare provider before taking these medicines if you have chronic liver or kidney disease. Also talk with your healthcare provider if you have had a stomach ulcer or GI bleeding.    Throat lozenges or sprays help reduce pain. Gargling with warm saltwater will also reduce throat pain. Dissolve 1/2 teaspoon of salt in 1 glass of warm water. This may be useful just before meals.     Soft foods are OK. Don't eat salty or spicy foods.  Follow-up care  Follow up with your healthcare provider or our staff if you don't get better over the next week.  When to seek medical advice  Call your  healthcare provider right away if any of these occur:    Fever of 100.4 F (38 C) or higher, or as directed by your healthcare provider    New or worsening ear pain, sinus pain, or headache    Painful lumps in the back of neck    Stiff neck    Lymph nodes getting larger or becoming soft in the middle    You can't swallow liquids or you can't open your mouth wide because of throat pain    Signs of dehydration. These include very dark urine or no urine, sunken eyes, and dizziness.    Trouble breathing or noisy breathing    Muffled voice    Rash  Prevention  Here are steps you can take to help prevent an infection:    Keep good hand washing habits.    Don t have close contact with people who have sore throats, colds, or other upper respiratory infections.    Don t smoke, and stay away from secondhand smoke.  Date Last Reviewed: 11/1/2017 2000-2018 [employer]. [address] [place] [id number]. All rights reserved. This information is not intended as a substitute for professional medical care. Always follow your healthcare professional's instructions.

## 2019-02-10 NOTE — PATIENT INSTRUCTIONS
Patient Education     Pharyngitis: Strep (Confirmed)    You have had a positive test for strep throat. Strep throat is a contagious illness. It is spread by coughing, kissing or by touching others after touching your mouth or nose. Symptoms include throat pain that is worse with swallowing, aching all over, headache, and fever. It is treated with antibiotic medicine. This should help you start to feel better in 1 to 2 days.  Home care    Rest at home. Drink plenty of fluids to you won't get dehydrated.    No work or school for the first 2 days of taking the antibiotics. After this time, you will not be contagious. You can then return to school or work if you are feeling better.     Take antibiotic medicine for the full 10 days, even if you feel better. This is very important to ensure the infection is treated. It is also important to prevent medicine-resistant germs from developing. If you were given an antibiotic shot, you don't need any more antibiotics.    You may use acetaminophen or ibuprofen to control pain or fever, unless another medicine was prescribed for this. Talk with your healthcare provider before taking these medicines if you have chronic liver or kidney disease. Also talk with your healthcare provider if you have had a stomach ulcer or GI bleeding.    Throat lozenges or sprays help reduce pain. Gargling with warm saltwater will also reduce throat pain. Dissolve 1/2 teaspoon of salt in 1 glass of warm water. This may be useful just before meals.     Soft foods are OK. Don't eat salty or spicy foods.  Follow-up care  Follow up with your healthcare provider or our staff if you don't get better over the next week.  When to seek medical advice  Call your healthcare provider right away if any of these occur:    Fever of 100.4 F (38 C) or higher, or as directed by your healthcare provider    New or worsening ear pain, sinus pain, or headache    Painful lumps in the back of neck    Stiff neck    Lymph  nodes getting larger or becoming soft in the middle    You can't swallow liquids or you can't open your mouth wide because of throat pain    Signs of dehydration. These include very dark urine or no urine, sunken eyes, and dizziness.    Trouble breathing or noisy breathing    Muffled voice    Rash  Prevention  Here are steps you can take to help prevent an infection:    Keep good hand washing habits.    Don t have close contact with people who have sore throats, colds, or other upper respiratory infections.    Don t smoke, and stay away from secondhand smoke.  Date Last Reviewed: 11/1/2017 2000-2018 [employer]. [address] [place] [address], [place] [address]. All rights reserved. This information is not intended as a substitute for professional medical care. Always follow your healthcare professional's instructions.

## 2019-02-10 NOTE — NURSING NOTE
"Chief Complaint   Patient presents with     Pharyngitis     started feel fatigue a few days ago, sore throat started this am        Initial /81   Pulse 89   Temp 99.2  F (37.3  C) (Oral)   Ht 1.651 m (5' 5\")   Wt 109.8 kg (242 lb)   SpO2 96%   BMI 40.27 kg/m   Estimated body mass index is 40.27 kg/m  as calculated from the following:    Height as of this encounter: 1.651 m (5' 5\").    Weight as of this encounter: 109.8 kg (242 lb).  Medication Reconciliation: complete    Ale Costa CMA      "

## 2019-03-04 DIAGNOSIS — Z20.818 STREPTOCOCCUS EXPOSURE: Primary | ICD-10-CM

## 2019-03-04 RX ORDER — AMOXICILLIN 500 MG/1
500 CAPSULE ORAL 2 TIMES DAILY
Qty: 20 CAPSULE | Refills: 0 | Status: SHIPPED | OUTPATIENT
Start: 2019-03-04 | End: 2019-03-14

## 2019-03-18 NOTE — TELEPHONE ENCOUNTER
"Requested Prescriptions   Pending Prescriptions Disp Refills     sildenafil (REVATIO) 20 MG tablet [Pharmacy Med Name: SILDENAFIL CITRATE 20MG TABS] 30 tablet 0     Sig: TAKE 1-5 TABLETS BY MOUTH 1 HOUR PRIOR TO SEXUAL ACTIVITY.  NEVER TAKE WITH NITROGLYCERIN, TERAZOSIN OR DOXAZOSIN.    Erectile Dysfuction Protocol Failed    5/9/2018  1:28 PM       Failed - Absence of nitrates on medication list       Passed - Absence of Alpha Blockers on Med list       Passed - Recent (12 mo) or future (30 days) visit within the authorizing provider's specialty    Patient had office visit in the last 12 months or has a visit in the next 30 days with authorizing provider or within the authorizing provider's specialty.  See \"Patient Info\" tab in inbasket, or \"Choose Columns\" in Meds & Orders section of the refill encounter.           Passed - Patient is age 18 or older        " depression/suicidal ideation

## 2019-05-08 ENCOUNTER — TRANSFERRED RECORDS (OUTPATIENT)
Dept: HEALTH INFORMATION MANAGEMENT | Facility: CLINIC | Age: 49
End: 2019-05-08

## 2019-10-10 DIAGNOSIS — N52.9 ERECTILE DYSFUNCTION, UNSPECIFIED ERECTILE DYSFUNCTION TYPE: ICD-10-CM

## 2019-10-10 RX ORDER — SILDENAFIL CITRATE 20 MG/1
TABLET ORAL
Qty: 30 TABLET | Refills: 0 | Status: SHIPPED | OUTPATIENT
Start: 2019-10-10 | End: 2020-06-05

## 2019-10-10 NOTE — TELEPHONE ENCOUNTER
"Requested Prescriptions   Pending Prescriptions Disp Refills     sildenafil (REVATIO) 20 MG tablet [Pharmacy Med Name: SILDENAFIL CITRATE 20MG TABS] 30 tablet 0     Sig: TAKE ONE TO FIVE TABLETS BY MOUTH DAILY 1 HOUR PRIOR TO SEXUAL ACTIVITY. (DO NOT TAKE WITH NITROGLYCERIN, TERAZOSIN, OR DOXAZOSIN)       Erectile Dysfuction Protocol Failed - 10/10/2019  8:41 AM        Failed - Absence of nitrates on medication list        Failed - Recent (12 mo) or future (30 days) visit within the authorizing provider's specialty     Patient has had an office visit with the authorizing provider or a provider within the authorizing providers department within the previous 12 mos or has a future within next 30 days. See \"Patient Info\" tab in inbasket, or \"Choose Columns\" in Meds & Orders section of the refill encounter.              Passed - Absence of Alpha Blockers on Med list        Passed - Medication is active on med list        Passed - Patient is age 18 or older          "

## 2020-02-29 ENCOUNTER — OFFICE VISIT (OUTPATIENT)
Dept: URGENT CARE | Facility: URGENT CARE | Age: 50
End: 2020-02-29
Payer: COMMERCIAL

## 2020-02-29 VITALS
DIASTOLIC BLOOD PRESSURE: 77 MMHG | HEART RATE: 103 BPM | TEMPERATURE: 97.1 F | SYSTOLIC BLOOD PRESSURE: 119 MMHG | OXYGEN SATURATION: 96 %

## 2020-02-29 DIAGNOSIS — J01.10 ACUTE NON-RECURRENT FRONTAL SINUSITIS: Primary | ICD-10-CM

## 2020-02-29 PROCEDURE — 99213 OFFICE O/P EST LOW 20 MIN: CPT | Performed by: NURSE PRACTITIONER

## 2020-02-29 NOTE — PROGRESS NOTES
SUBJECTIVE:  Naren Berger is a 49 year old male here with concerns about sinus infection.  He states onset of symptoms were 2 week(s) ago.  He has had frontal pressure. Course of illness is worsening. Severity moderate  Current and Associated symptoms: nasal congestion, rhinorrhea, facial pain/pressure, headache and post-nasal drainage  Predisposing factors include none. Recent treatment has included: OTC meds    Past Medical History:   Diagnosis Date     CAD (coronary artery disease)     MI 2016     NO ACTIVE PROBLEMS      Social History     Tobacco Use     Smoking status: Former Smoker     Packs/day: 0.50     Years: 20.00     Pack years: 10.00     Types: Cigarettes     Smokeless tobacco: Never Used   Substance Use Topics     Alcohol use: No       ROS:  Review of systems negative except as stated above.    OBJECTIVE:  /77   Pulse 103   Temp 97.1  F (36.2  C) (Tympanic)   SpO2 96%   Exam:GENERAL APPEARANCE: alert and no distress  EYES: EOMI,  PERRL, conjunctiva clear  HENT: TM's normal bilaterally, rhinorrhea yellow, oral mucous membranes moist, no erythema noted and frontal sinus tenderness   NECK: supple, nontender, no lymphadenopathy  RESP: lungs clear to auscultation - no rales, rhonchi or wheezes  CV: regular rates and rhythm, normal S1 S2, no murmur noted  NEURO: Normal strength and tone, sensory exam grossly normal,  normal speech and mentation  SKIN: no suspicious lesions or rashes    ASSESSMENT:  Sinusitis    PLAN:  Augmentin BID X 10 days  Ibuprofen fluids rest home treat and monitor symptoms call or rtc if new or worsening  Follow up with primary clinic if not improving    GEORGINA Cabral CNP

## 2020-03-17 ENCOUNTER — MYC MEDICAL ADVICE (OUTPATIENT)
Dept: FAMILY MEDICINE | Facility: CLINIC | Age: 50
End: 2020-03-17

## 2020-03-17 ENCOUNTER — E-VISIT (OUTPATIENT)
Dept: FAMILY MEDICINE | Facility: CLINIC | Age: 50
End: 2020-03-17
Payer: COMMERCIAL

## 2020-03-17 DIAGNOSIS — J32.9 SINUSITIS, UNSPECIFIED CHRONICITY, UNSPECIFIED LOCATION: Primary | ICD-10-CM

## 2020-03-17 PROCEDURE — 99421 OL DIG E/M SVC 5-10 MIN: CPT | Performed by: PHYSICIAN ASSISTANT

## 2020-03-17 NOTE — TELEPHONE ENCOUNTER
Patient treated 2/29/20 per urgent care for sinusitis with augment bid x 10 days.  Note states symptoms onset were 2 weeks prior.  Viddyad message sent to patient for more information.  Gwendolyn Heath RN

## 2020-03-18 RX ORDER — CEFUROXIME AXETIL 500 MG/1
500 TABLET ORAL 2 TIMES DAILY
Qty: 20 TABLET | Refills: 0 | Status: SHIPPED | OUTPATIENT
Start: 2020-03-18 | End: 2021-04-20

## 2020-03-18 NOTE — PATIENT INSTRUCTIONS
Thank you for choosing us for your care. I have placed an order for a prescription so that you can start treatment. View your full visit summary for details by clicking on the link below. Your pharmacist will able to address any questions you may have about the medication.     If you're not feeling better within 5-7 days, please schedule an appointment.  You can schedule an appointment right here in [facility], or call [phone]  If the visit is for the same symptoms as your e-visit, we'll refund the cost of your e-visit if seen within seven days.    You may want to try a nasal lavage (also known as nasal irrigation). You can find over-the-counter products, such as Neti-Pot, at retail locations or make your own at home. Instructions for homemade nasal lavage and more information on the process are available online at http://[email or web address].org/[email or web address]/[email or web address].      Sinusitis (Antibiotic Treatment)    The sinuses are air-filled spaces within the bones of the face. They connect to the inside of the nose. Sinusitis is an inflammation of the tissue that lines the sinuses. Sinusitis can occur during a cold. It can also happen due to allergies to pollens and other particles in the air. Sinusitis can cause symptoms of sinus congestion and a feeling of fullness. A sinus infection causes fever, headache, and facial pain. There is often green or yellow fluid draining from the nose or into the back of the throat (post-nasal drip). You have been given antibiotics to treat this condition.  Home care    Take the full course of antibiotics as instructed. Do not stop taking them, even when you feel better.    Drink plenty of water, hot tea, and other liquids. This may help thin nasal mucus. It also may help your sinuses drain fluids.    Heat may help soothe painful areas of your face. Use a towel soaked in hot water. Or,  the shower and direct the warm spray onto your face. Using a vaporizer along with a menthol rub at  night may also help soothe symptoms.     An expectorant with guaifenesin may help thin nasal mucus and help your sinuses drain fluids.    You can use an over-the-counter decongestant, unless a similar medicine was prescribed to you. Nasal sprays work the fastest. Use one that contains phenylephrine or oxymetazoline. First blow your nose gently. Then use the spray. Do not use these medicines more often than directed on the label. If you do, your symptoms may get worse. You may also take pills that contain pseudoephedrine. Don t use products that combine multiple medicines. This is because side effects may be increased. Read labels. You can also ask the pharmacist for help. (People with high blood pressure should not use decongestants. They can raise blood pressure.)    Over-the-counter antihistamines may help if allergies contributed to your sinusitis.      Do not use nasal rinses or irrigation during an acute sinus infection, unless your healthcare provider tells you to. Rinsing may spread the infection to other areas in your sinuses.    Use acetaminophen or ibuprofen to control pain, unless another pain medicine was prescribed to you. If you have chronic liver or kidney disease or ever had a stomach ulcer, talk with your healthcare provider before using these medicines. (Aspirin should never be taken by anyone under age 18 who is ill with a fever. It may cause severe liver damage.)    Don't smoke. This can make symptoms worse.  Follow-up care  Follow up with your healthcare provider or our staff if you are not better in 1 week.  When to seek medical advice  Call your healthcare provider if any of these occur:    Facial pain or headache that gets worse    Stiff neck    Unusual drowsiness or confusion    Swelling of your forehead or eyelids    Vision problems, such as blurred or double vision    Fever of 100.4 F (38 C) or higher, or as directed by your healthcare provider    Seizure    Breathing problems    Symptoms  don't go away in 10 days  Prevention  Here are steps you can take to help prevent an infection:    Keep good hand washing habits.    Don t have close contact with people who have sore throats, colds, or other upper respiratory infections.    Don t smoke, and stay away from secondhand smoke.    Stay up to date with of your vaccines.  Date Last Reviewed: 11/1/2017 2000-2019 [employer]. [address] [place] [address], [place] [id number]. All rights reserved. This information is not intended as a substitute for professional medical care. Always follow your healthcare professional's instructions.

## 2020-04-24 ENCOUNTER — E-VISIT (OUTPATIENT)
Dept: FAMILY MEDICINE | Facility: CLINIC | Age: 50
End: 2020-04-24
Payer: COMMERCIAL

## 2020-04-24 DIAGNOSIS — J32.9 SINUSITIS, UNSPECIFIED CHRONICITY, UNSPECIFIED LOCATION: Primary | ICD-10-CM

## 2020-04-24 PROCEDURE — 99421 OL DIG E/M SVC 5-10 MIN: CPT | Performed by: PHYSICIAN ASSISTANT

## 2020-04-24 RX ORDER — DOXYCYCLINE 100 MG/1
100 CAPSULE ORAL 2 TIMES DAILY
Qty: 20 CAPSULE | Refills: 0 | Status: SHIPPED | OUTPATIENT
Start: 2020-04-24 | End: 2020-05-04

## 2020-04-27 ENCOUNTER — TRANSFERRED RECORDS (OUTPATIENT)
Dept: HEALTH INFORMATION MANAGEMENT | Facility: CLINIC | Age: 50
End: 2020-04-27

## 2020-06-03 DIAGNOSIS — N52.9 ERECTILE DYSFUNCTION, UNSPECIFIED ERECTILE DYSFUNCTION TYPE: ICD-10-CM

## 2020-06-03 NOTE — TELEPHONE ENCOUNTER
Sildenafil refill request  Last OV Louis Colon PA-C was 05/2018;  EVISIT 4/24/20 Louis Colon PA-C for sinus   seen twice since in urgent care for strep/sinus.  Patient has nitroglycerin on med list; cardiology OV note 5/8/19 states he hasn't had to use it.   To Louis Colon PA-C to advise on refill request due to nitroglycerin on med list.  Gwendolyn Heath RN

## 2020-06-05 RX ORDER — SILDENAFIL CITRATE 20 MG/1
TABLET ORAL
Qty: 30 TABLET | Refills: 1 | Status: SHIPPED | OUTPATIENT
Start: 2020-06-05 | End: 2023-03-31

## 2020-06-25 ENCOUNTER — TELEPHONE (OUTPATIENT)
Dept: FAMILY MEDICINE | Facility: CLINIC | Age: 50
End: 2020-06-25

## 2020-06-25 ENCOUNTER — E-VISIT (OUTPATIENT)
Dept: FAMILY MEDICINE | Facility: CLINIC | Age: 50
End: 2020-06-25
Payer: COMMERCIAL

## 2020-06-25 DIAGNOSIS — J01.90 ACUTE SINUSITIS WITH SYMPTOMS > 10 DAYS: Primary | ICD-10-CM

## 2020-06-25 PROCEDURE — 99421 OL DIG E/M SVC 5-10 MIN: CPT | Performed by: PHYSICIAN ASSISTANT

## 2020-06-25 NOTE — TELEPHONE ENCOUNTER
Reason for Call:  Other call back    Detailed comments: patient is calling stating would like to know status on evisit, patient is wondering if should go to urgent care or not at this point. Please call to discuss. Thank you.    Phone Number Patient can be reached at: Home number on file [phone] (home)    Best Time:     Can we leave a detailed message on this number? YES    Call taken on 6/25/2020 at 2:10 PM by Thais Suarez

## 2020-06-25 NOTE — PATIENT INSTRUCTIONS

## 2020-06-25 NOTE — TELEPHONE ENCOUNTER
Patient notified that Dr. Steinberg is looking at his E-visit as we speak and will address his needs.  He asked about the colonoscopy and I asked him to reach out to the entity doing the colonoscopy and ask them about it as they will be administering the anesthesia.  Patient verbalizes good understanding, agrees with plan and states she needs no further support. Dixie Jaquez R.N.

## 2020-07-21 ENCOUNTER — OFFICE VISIT (OUTPATIENT)
Dept: OTOLARYNGOLOGY | Facility: CLINIC | Age: 50
End: 2020-07-21
Payer: COMMERCIAL

## 2020-07-21 VITALS
SYSTOLIC BLOOD PRESSURE: 128 MMHG | OXYGEN SATURATION: 94 % | TEMPERATURE: 97.6 F | DIASTOLIC BLOOD PRESSURE: 87 MMHG | HEART RATE: 75 BPM

## 2020-07-21 DIAGNOSIS — J01.01 ACUTE RECURRENT MAXILLARY SINUSITIS: Primary | ICD-10-CM

## 2020-07-21 PROCEDURE — 99204 OFFICE O/P NEW MOD 45 MIN: CPT | Performed by: OTOLARYNGOLOGY

## 2020-07-21 NOTE — PROGRESS NOTES
I am seeing this patient in consultation for sinusitis at the request of the provider Dr. Isauro Boo.    Chief Complaint - sinusitis    History of Present Illness - Naren Berger is a 49 year old male who presents for evaluation of possible recurrent sinusitis. The patient describes symptoms of discolored drainage, foul smell/taste, irritated nasal cavity, nasal obstruction, and headaches. Presently, the patient is NOT symptomatic. These symptoms have recurred 3 in the past 5-6 months. Started after getting a CPAP. However, stopped CPAP, but is still getting infections. The patient notes no allergies. Treatments have included antibiotics. The treatments seem to help, but symptoms return. No prior history of sinus surgery.     Past Medical History -   Patient Active Problem List   Diagnosis     CARDIOVASCULAR SCREENING; LDL GOAL LESS THAN 160     Trigger thumb of left hand     Bilateral low back pain without sciatica     Coronary artery disease involving native heart with angina pectoris, unspecified vessel or lesion type (H)     Bilateral carpal tunnel syndrome     Skin tag     Erectile dysfunction, unspecified erectile dysfunction type     Psoriasis       Current Medications -   Current Outpatient Medications:      amoxicillin-clavulanate (AUGMENTIN) 875-125 MG tablet, Take 1 tablet by mouth 2 times daily, Disp: 20 tablet, Rfl: 0     aspirin 81 MG chewable tablet, 81 mg, Disp: , Rfl:      atorvastatin (LIPITOR) 40 MG tablet, , Disp: , Rfl:      cefuroxime (CEFTIN) 500 MG tablet, Take 1 tablet (500 mg) by mouth 2 times daily, Disp: 20 tablet, Rfl: 0     methocarbamol (ROBAXIN) 750 MG tablet, Take 1 tablet (750 mg) by mouth 3 times daily as needed for muscle spasms, Disp: 30 tablet, Rfl: 0     nitroglycerin (NITROSTAT) 0.4 MG sublingual tablet, Place 0.4 mg under the tongue, Disp: , Rfl:      sildenafil (REVATIO) 20 MG tablet, TAKE 1-5 TABLETS BY MOUTH ONE HOUR PRIOR TO SEXUAL ACTIVITY. DO NOT TAKE WITH  NITROGLYCERIN, TERAZOSIN OR DOXAZOSIN., Disp: 30 tablet, Rfl: 1     triamcinolone (KENALOG) 0.5 % cream, Apply sparingly to affected area three times daily., Disp: 60 g, Rfl: 1    Allergies -   Allergies   Allergen Reactions     No Known Allergies        Social History -   Social History     Socioeconomic History     Marital status:      Spouse name: Not on file     Number of children: Not on file     Years of education: Not on file     Highest education level: Not on file   Occupational History     Not on file   Social Needs     Financial resource strain: Not on file     Food insecurity     Worry: Not on file     Inability: Not on file     Transportation needs     Medical: Not on file     Non-medical: Not on file   Tobacco Use     Smoking status: Former Smoker     Packs/day: 0.50     Years: 20.00     Pack years: 10.00     Types: Cigarettes     Smokeless tobacco: Never Used   Substance and Sexual Activity     Alcohol use: No     Drug use: No     Sexual activity: Yes     Partners: Female   Lifestyle     Physical activity     Days per week: Not on file     Minutes per session: Not on file     Stress: Not on file   Relationships     Social connections     Talks on phone: Not on file     Gets together: Not on file     Attends Voodoo service: Not on file     Active member of club or organization: Not on file     Attends meetings of clubs or organizations: Not on file     Relationship status: Not on file     Intimate partner violence     Fear of current or ex partner: Not on file     Emotionally abused: Not on file     Physically abused: Not on file     Forced sexual activity: Not on file   Other Topics Concern     Parent/sibling w/ CABG, MI or angioplasty before 65F 55M? Not Asked   Social History Narrative     Not on file       Family History -   Family History   Problem Relation Age of Onset     Diabetes Father      Kidney Disease Father        Review of Systems - As per HPI and PMHx, otherwise 10+  comprehensive system review is negative.    Physical Exam  /87   Pulse 75   Temp 97.6  F (36.4  C)   SpO2 94%   General - The patient is nontoxic, in no distress. Alert and oriented to person and place, answers questions and cooperates with examination appropriately.   Neurologic - CN II-XII are intact. No focal neurologic deficits.   Voice and Breathing - The patient was breathing comfortably without the use of accessory muscles. There was no wheezing, stridor, or stertor.  The patients voice was clear and strong.  Eyes - Extraocular movements intact.  Sclera were not icteric or injected, conjunctiva were pink and moist.  Mouth - Examination of the oral cavity showed pink, healthy oral mucosa. No lesions or ulcerations noted.  The tongue was mobile and midline.  Throat - The walls of the oropharynx were smooth, symmetric, and had no lesions or ulcerations.  white postnasal drainage.  The uvula was midline on elevation.  Nose - External contour is symmetric, no gross deflection or scars. Nasal mucosa is pink and moist with no abnormal mucus.  The septum was midline and non-obstructive, turbinates of normal size and position.  No polyps, masses, or purulence noted on examination.  Neck - Soft, non-tender. Palpation of the occipital, submental, submandibular, internal jugular chain, and supraclavicular nodes did not demonstrate any abnormal lymph nodes or masses. No parotid masses. Palpation of the thyroid was soft and smooth, with no nodules or goiter appreciated.  The trachea was mobile and midline.  Cardiovascular - carotid pulses are 2+ bilaterally, regular rhythm      A/P - Naren Berger is a 49 year old male with recurrent sinusitis. He can try nasal saline irrigations, a kit and instructions were provided. I recommend a CT scan to determine if the patient is having chronic sinusitis and to visualize the anatomy. We will review the study at that time to determine any evidence of sinus disease that  might be helped with further medical treatment or possibly surgical intervention.       Evelio Canseco MD  Otolaryngology  [facility]

## 2020-07-21 NOTE — LETTER
7/21/2020         RE: Naren Berger  [address] [facility] [address]        Dear Colleague,    Thank you for referring your patient, Naren Berger, to the [facility]. Please see a copy of my visit note below.    I am seeing this patient in consultation for sinusitis at the request of the provider Dr. Isauro Boo.    Chief Complaint - sinusitis    History of Present Illness - Naren Berger is a 49 year old male who presents for evaluation of possible recurrent sinusitis. The patient describes symptoms of discolored drainage, foul smell/taste, irritated nasal cavity, nasal obstruction, and headaches. Presently, the patient is NOT symptomatic. These symptoms have recurred 3 in the past 5-6 months. Started after getting a CPAP. However, stopped CPAP, but is still getting infections. The patient notes no allergies. Treatments have included antibiotics. The treatments seem to help, but symptoms return. No prior history of sinus surgery.     Past Medical History -   Patient Active Problem List   Diagnosis     CARDIOVASCULAR SCREENING; LDL GOAL LESS THAN 160     Trigger thumb of left hand     Bilateral low back pain without sciatica     Coronary artery disease involving native heart with angina pectoris, unspecified vessel or lesion type (H)     Bilateral carpal tunnel syndrome     Skin tag     Erectile dysfunction, unspecified erectile dysfunction type     Psoriasis       Current Medications -   Current Outpatient Medications:      amoxicillin-clavulanate (AUGMENTIN) 875-125 MG tablet, Take 1 tablet by mouth 2 times daily, Disp: 20 tablet, Rfl: 0     aspirin 81 MG chewable tablet, 81 mg, Disp: , Rfl:      atorvastatin (LIPITOR) 40 MG tablet, , Disp: , Rfl:      cefuroxime (CEFTIN) 500 MG tablet, Take 1 tablet (500 mg) by mouth 2 times daily, Disp: 20 tablet, Rfl: 0     methocarbamol (ROBAXIN) 750 MG tablet, Take 1 tablet (750 mg) by mouth 3 times daily as needed for muscle  spasms, Disp: 30 tablet, Rfl: 0     nitroglycerin (NITROSTAT) 0.4 MG sublingual tablet, Place 0.4 mg under the tongue, Disp: , Rfl:      sildenafil (REVATIO) 20 MG tablet, TAKE 1-5 TABLETS BY MOUTH ONE HOUR PRIOR TO SEXUAL ACTIVITY. DO NOT TAKE WITH NITROGLYCERIN, TERAZOSIN OR DOXAZOSIN., Disp: 30 tablet, Rfl: 1     triamcinolone (KENALOG) 0.5 % cream, Apply sparingly to affected area three times daily., Disp: 60 g, Rfl: 1    Allergies -   Allergies   Allergen Reactions     No Known Allergies        Social History -   Social History     Socioeconomic History     Marital status:      Spouse name: Not on file     Number of children: Not on file     Years of education: Not on file     Highest education level: Not on file   Occupational History     Not on file   Social Needs     Financial resource strain: Not on file     Food insecurity     Worry: Not on file     Inability: Not on file     Transportation needs     Medical: Not on file     Non-medical: Not on file   Tobacco Use     Smoking status: Former Smoker     Packs/day: 0.50     Years: 20.00     Pack years: 10.00     Types: Cigarettes     Smokeless tobacco: Never Used   Substance and Sexual Activity     Alcohol use: No     Drug use: No     Sexual activity: Yes     Partners: Female   Lifestyle     Physical activity     Days per week: Not on file     Minutes per session: Not on file     Stress: Not on file   Relationships     Social connections     Talks on phone: Not on file     Gets together: Not on file     Attends Baptism service: Not on file     Active member of club or organization: Not on file     Attends meetings of clubs or organizations: Not on file     Relationship status: Not on file     Intimate partner violence     Fear of current or ex partner: Not on file     Emotionally abused: Not on file     Physically abused: Not on file     Forced sexual activity: Not on file   Other Topics Concern     Parent/sibling w/ CABG, MI or angioplasty before 65F  55M? Not Asked   Social History Narrative     Not on file       Family History -   Family History   Problem Relation Age of Onset     Diabetes Father      Kidney Disease Father        Review of Systems - As per HPI and PMHx, otherwise 10+ comprehensive system review is negative.    Physical Exam  /87   Pulse 75   Temp 97.6  F (36.4  C)   SpO2 94%   General - The patient is nontoxic, in no distress. Alert and oriented to person and place, answers questions and cooperates with examination appropriately.   Neurologic - CN II-XII are intact. No focal neurologic deficits.   Voice and Breathing - The patient was breathing comfortably without the use of accessory muscles. There was no wheezing, stridor, or stertor.  The patients voice was clear and strong.  Eyes - Extraocular movements intact.  Sclera were not icteric or injected, conjunctiva were pink and moist.  Mouth - Examination of the oral cavity showed pink, healthy oral mucosa. No lesions or ulcerations noted.  The tongue was mobile and midline.  Throat - The walls of the oropharynx were smooth, symmetric, and had no lesions or ulcerations.  white postnasal drainage.  The uvula was midline on elevation.  Nose - External contour is symmetric, no gross deflection or scars. Nasal mucosa is pink and moist with no abnormal mucus.  The septum was midline and non-obstructive, turbinates of normal size and position.  No polyps, masses, or purulence noted on examination.  Neck - Soft, non-tender. Palpation of the occipital, submental, submandibular, internal jugular chain, and supraclavicular nodes did not demonstrate any abnormal lymph nodes or masses. No parotid masses. Palpation of the thyroid was soft and smooth, with no nodules or goiter appreciated.  The trachea was mobile and midline.  Cardiovascular - carotid pulses are 2+ bilaterally, regular rhythm      A/P - Naren Berger is a 49 year old male with recurrent sinusitis. He can try nasal saline  irrigations, a kit and instructions were provided. I recommend a CT scan to determine if the patient is having chronic sinusitis and to visualize the anatomy. We will review the study at that time to determine any evidence of sinus disease that might be helped with further medical treatment or possibly surgical intervention.       Evelio Canseco MD  Otolaryngology  [facility]      Again, thank you for allowing me to participate in the care of your patient.        Sincerely,        Evelio Canseco MD

## 2020-11-07 ENCOUNTER — HEALTH MAINTENANCE LETTER (OUTPATIENT)
Age: 50
End: 2020-11-07

## 2021-04-02 ENCOUNTER — IMMUNIZATION (OUTPATIENT)
Dept: NURSING | Facility: CLINIC | Age: 51
End: 2021-04-02
Payer: COMMERCIAL

## 2021-04-02 PROCEDURE — 91300 PR COVID VAC PFIZER DIL RECON 30 MCG/0.3 ML IM: CPT

## 2021-04-02 PROCEDURE — 0001A PR COVID VAC PFIZER DIL RECON 30 MCG/0.3 ML IM: CPT

## 2021-04-20 ENCOUNTER — OFFICE VISIT (OUTPATIENT)
Dept: FAMILY MEDICINE | Facility: CLINIC | Age: 51
End: 2021-04-20
Payer: COMMERCIAL

## 2021-04-20 VITALS
BODY MASS INDEX: 40.65 KG/M2 | WEIGHT: 244 LBS | DIASTOLIC BLOOD PRESSURE: 75 MMHG | HEIGHT: 65 IN | HEART RATE: 80 BPM | SYSTOLIC BLOOD PRESSURE: 129 MMHG | TEMPERATURE: 98.5 F

## 2021-04-20 DIAGNOSIS — H60.502 ACUTE OTITIS EXTERNA OF LEFT EAR, UNSPECIFIED TYPE: Primary | ICD-10-CM

## 2021-04-20 PROCEDURE — 99213 OFFICE O/P EST LOW 20 MIN: CPT | Performed by: FAMILY MEDICINE

## 2021-04-20 RX ORDER — CIPROFLOXACIN AND DEXAMETHASONE 3; 1 MG/ML; MG/ML
4 SUSPENSION/ DROPS AURICULAR (OTIC) 2 TIMES DAILY
Qty: 2.8 ML | Refills: 0 | Status: SHIPPED | OUTPATIENT
Start: 2021-04-20 | End: 2021-04-27

## 2021-04-20 ASSESSMENT — MIFFLIN-ST. JEOR: SCORE: 1883.66

## 2021-04-20 NOTE — PROGRESS NOTES
"    Assessment & Plan     Acute otitis externa of left ear, unspecified type  Follow-up if not improving  - ciprofloxacin-dexamethasone (CIPRODEX) 0.3-0.1 % otic suspension; Place 4 drops Into the left ear 2 times daily for 7 days             BMI:   Estimated body mass index is 40.6 kg/m  as calculated from the following:    Height as of this encounter: 1.651 m (5' 5\").    Weight as of this encounter: 110.7 kg (244 lb).   Weight management plan: Discussed healthy diet and exercise guidelines        No follow-ups on file.    Sarah Resendez MD  [facility]    Chino Sneed is a 50 year old who presents for the following health issues     HPI     Left ear plugged, felt some draining   No significant pain at this time, it is intermittent  Has h/o ear infections in the past        Review of Systems   Constitutional, HEENT, cardiovascular, pulmonary, gi and gu systems are negative, except as otherwise noted.      Objective    /75   Pulse 80   Temp 98.5  F (36.9  C) (Oral)   Ht 1.651 m (5' 5\")   Wt 110.7 kg (244 lb)   BMI 40.60 kg/m    Body mass index is 40.6 kg/m .  Physical Exam   GENERAL: healthy, alert and no distress  HENT: normal cephalic/atraumatic and left ear: red and boggy canal    No results found for this or any previous visit (from the past 24 hour(s)).            "

## 2021-04-22 ENCOUNTER — MYC MEDICAL ADVICE (OUTPATIENT)
Dept: FAMILY MEDICINE | Facility: CLINIC | Age: 51
End: 2021-04-22

## 2021-04-22 DIAGNOSIS — H92.02 LEFT EAR PAIN: Primary | ICD-10-CM

## 2021-04-22 RX ORDER — AMOXICILLIN 500 MG/1
500 CAPSULE ORAL 2 TIMES DAILY
Qty: 20 CAPSULE | Refills: 0 | Status: SHIPPED | OUTPATIENT
Start: 2021-04-22 | End: 2022-05-16

## 2021-04-23 ENCOUNTER — IMMUNIZATION (OUTPATIENT)
Dept: NURSING | Facility: CLINIC | Age: 51
End: 2021-04-23
Attending: PHYSICAL MEDICINE & REHABILITATION
Payer: COMMERCIAL

## 2021-04-23 PROCEDURE — 0002A PR COVID VAC PFIZER DIL RECON 30 MCG/0.3 ML IM: CPT

## 2021-04-23 PROCEDURE — 91300 PR COVID VAC PFIZER DIL RECON 30 MCG/0.3 ML IM: CPT

## 2021-05-27 ENCOUNTER — OFFICE VISIT (OUTPATIENT)
Dept: OTOLARYNGOLOGY | Facility: CLINIC | Age: 51
End: 2021-05-27
Payer: COMMERCIAL

## 2021-05-27 ENCOUNTER — OFFICE VISIT (OUTPATIENT)
Dept: AUDIOLOGY | Facility: CLINIC | Age: 51
End: 2021-05-27
Payer: COMMERCIAL

## 2021-05-27 VITALS
SYSTOLIC BLOOD PRESSURE: 132 MMHG | DIASTOLIC BLOOD PRESSURE: 86 MMHG | RESPIRATION RATE: 16 BRPM | OXYGEN SATURATION: 97 % | HEART RATE: 69 BPM

## 2021-05-27 DIAGNOSIS — H93.8X2 PLUGGED FEELING IN EAR, LEFT: Primary | ICD-10-CM

## 2021-05-27 DIAGNOSIS — H93.8X2 PLUGGED FEELING IN EAR, LEFT: ICD-10-CM

## 2021-05-27 DIAGNOSIS — H90.3 SENSORINEURAL HEARING LOSS, BILATERAL: Primary | ICD-10-CM

## 2021-05-27 PROCEDURE — 92504 EAR MICROSCOPY EXAMINATION: CPT | Performed by: OTOLARYNGOLOGY

## 2021-05-27 PROCEDURE — 92557 COMPREHENSIVE HEARING TEST: CPT | Performed by: AUDIOLOGIST

## 2021-05-27 PROCEDURE — 92550 TYMPANOMETRY & REFLEX THRESH: CPT | Performed by: AUDIOLOGIST

## 2021-05-27 PROCEDURE — 99213 OFFICE O/P EST LOW 20 MIN: CPT | Mod: 25 | Performed by: OTOLARYNGOLOGY

## 2021-05-27 PROCEDURE — 99207 PR NO CHARGE LOS: CPT | Performed by: AUDIOLOGIST

## 2021-05-27 NOTE — PROGRESS NOTES
Chief Complaint - ear infection    History of Present Illness - Naren Berger is a 50 year old male who presents to me today with an ear infection. He had pressure and pain. He could hear sloshing. He was prescribed drops. It didn't help. He was given oral antibiotics. That helped. Then last week it got better. He still feels left ear is a little plugged. He tries to pop it, but it never feels fully unplugged. No prior h/o ear infections. No pain now.     I saw him in 7/2020 for sinusitis. I recommended a sinus CT, but he didn't get it done. Sinusitis when away when he stopped his CPAP.    Past Medical History -   Patient Active Problem List   Diagnosis     CARDIOVASCULAR SCREENING; LDL GOAL LESS THAN 160     Trigger thumb of left hand     Bilateral low back pain without sciatica     Coronary artery disease involving native heart with angina pectoris, unspecified vessel or lesion type (H)     Bilateral carpal tunnel syndrome     Skin tag     Erectile dysfunction, unspecified erectile dysfunction type     Psoriasis       Current Medications -   Current Outpatient Medications:      amoxicillin (AMOXIL) 500 MG capsule, Take 1 capsule (500 mg) by mouth 2 times daily, Disp: 20 capsule, Rfl: 0     aspirin 81 MG chewable tablet, 81 mg, Disp: , Rfl:      atorvastatin (LIPITOR) 40 MG tablet, , Disp: , Rfl:      nitroglycerin (NITROSTAT) 0.4 MG sublingual tablet, Place 0.4 mg under the tongue, Disp: , Rfl:      sildenafil (REVATIO) 20 MG tablet, TAKE 1-5 TABLETS BY MOUTH ONE HOUR PRIOR TO SEXUAL ACTIVITY. DO NOT TAKE WITH NITROGLYCERIN, TERAZOSIN OR DOXAZOSIN., Disp: 30 tablet, Rfl: 1    Allergies -   Allergies   Allergen Reactions     No Known Allergies        Social History -   Social History     Socioeconomic History     Marital status:      Spouse name: Not on file     Number of children: Not on file     Years of education: Not on file     Highest education level: Not on file   Occupational History     Not on  file   Social Needs     Financial resource strain: Not on file     Food insecurity     Worry: Not on file     Inability: Not on file     Transportation needs     Medical: Not on file     Non-medical: Not on file   Tobacco Use     Smoking status: Former Smoker     Packs/day: 0.50     Years: 20.00     Pack years: 10.00     Types: Cigarettes     Smokeless tobacco: Never Used   Substance and Sexual Activity     Alcohol use: No     Drug use: No     Sexual activity: Yes     Partners: Female   Lifestyle     Physical activity     Days per week: Not on file     Minutes per session: Not on file     Stress: Not on file   Relationships     Social connections     Talks on phone: Not on file     Gets together: Not on file     Attends Oriental orthodox service: Not on file     Active member of club or organization: Not on file     Attends meetings of clubs or organizations: Not on file     Relationship status: Not on file     Intimate partner violence     Fear of current or ex partner: Not on file     Emotionally abused: Not on file     Physically abused: Not on file     Forced sexual activity: Not on file   Other Topics Concern     Parent/sibling w/ CABG, MI or angioplasty before 65F 55M? Not Asked   Social History Narrative     Not on file       Family History -   Family History   Problem Relation Age of Onset     Diabetes Father      Kidney Disease Father        Review of Systems - As per HPI and PMHx, otherwise 7 system review is negative.    Physical Exam  /86   Pulse 69   Resp 16   SpO2 97%   General - The patient is in no distress.  Alert and oriented to person and place, answers questions and cooperates with examination appropriately.   Voice and Breathing - The patient was breathing comfortably without the use of accessory muscles. There was no wheezing, stridor, or stertor.  The patients voice was clear and strong.  Ears - The auricles are normal. Left tympanic membrane intact. Small piece of cerumen on the inferior  tympanic membrane. I used the otomicroscope and teased this off with a pick. I then grabbed it with an alligator. No effusion seen. No infection. Right ear normal. No effusion.   Eyes - Extraocular movements intact.  Sclera were not icteric or injected.  Neck - Soft, non-tender. Palpation of the occipital, submental, submandibular, internal jugular chain, and supraclavicular nodes did not demonstrate any abnormal lymph nodes or masses. Parotid glands had no masses. Palpation of the thyroid was soft and smooth, with no nodules or goiter appreciated.  The trachea was mobile and midline.  Neurological - Cranial nerves 2 through 12 were grossly intact. House-Brackmann grade 1 out of 6 bilaterally.       Audiologic Studies - An audiogram and tympanogram were performed today as part of the evaluation and personally reviewed. The tympanogram shows a normal Type A curve, with normal canal volume and middle ear pressure.  There is no sign of eustachian tube dysfunction or middle ear effusion.  The audiogram showed a significant down-sloping high frequency sensorineural hearing loss at 8K.       Assessment and Plan - Naren Berger is a 50 year old male who presents to me today with left ear plugging following AOM. Infection is gone. He had a crust on the tympanic membrane with a type As tymp. It removed the wax. Hopefully this helps. If not, it could be resolving effusion. He should continue ear popping and give this more time. Return if this fails, or contact me. We could try steroids.     He has sensorineural hearing loss, recheck hearing in 1-2 years.     Evelio Canseco MD  Otolaryngology  [facility]

## 2021-05-27 NOTE — PROGRESS NOTES
AUDIOLOGY REPORT:    Patient was referred from ENT by Dr. Canseco for audiology evaluation. The patient reports that he had an ear infection in the left ear around 3-4 weeks ago, and the ear still feels plugged. He reports that he had a decrease in hearing in the left ear, and he is unsure if it is back to baseline. The patient reports that he had otalgia in the left ear, which is now intermittent. He reports occasional tinnitus in both ears. The patient notes that he has been diagnosed with psoriasis in his ears. He reports a family history of hearing loss with age. The patient reports that he does not have a history of ear problems or ear surgery or a history of loud noise exposure.    Testing:    Otoscopy:   Otoscopic exam indicates ears are clear of cerumen bilaterally     Tympanograms:    RIGHT: normal eardrum mobility     LEFT:   restricted eardrum mobility (type As)    Reflexes (reported by stimulus ear):  RIGHT: Ipsilateral is present at normal levels  RIGHT: Contralateral is present at normal levels  LEFT:   Ipsilateral is present at normal levels  LEFT:   Contralateral is present at normal levels    Thresholds:   Pure Tone Thresholds assessed using conventional audiometry with good reliability from 250-8000 Hz bilaterally using insert earphones and circumaural headphones     RIGHT:  normal hearing sensitivity through 6000 Hz sloping to severe sensorineural hearing loss    LEFT:    normal hearing sensitivity through 6000 Hz sloping to moderate sensorineural hearing loss    Speech Reception Threshold:    RIGHT: 5 dB HL    LEFT:   5 dB HL  Results are in agreement with pure tone average.     Word Recognition Score:     RIGHT: 96% at 50 dB HL using NU-6 recorded word list.    LEFT:   100% at 50 dB HL using NU-6 recorded word list.    Discussed results with the patient.     Patient was returned to ENT for follow up.     Jovan Sosa, CCC-A  Licensed Audiologist #[id number]  5/27/2021

## 2021-05-27 NOTE — LETTER
5/27/2021         RE: Naren Berger  [address] [facility] [address]        Dear Colleague,    Thank you for referring your patient, Naren Berger, to the [facility]. Please see a copy of my visit note below.    Chief Complaint - ear infection    History of Present Illness - Naren Berger is a 50 year old male who presents to me today with an ear infection. He had pressure and pain. He could hear sloshing. He was prescribed drops. It didn't help. He was given oral antibiotics. That helped. Then last week it got better. He still feels left ear is a little plugged. He tries to pop it, but it never feels fully unplugged. No prior h/o ear infections. No pain now.     I saw him in 7/2020 for sinusitis. I recommended a sinus CT, but he didn't get it done. Sinusitis when away when he stopped his CPAP.    Past Medical History -   Patient Active Problem List   Diagnosis     CARDIOVASCULAR SCREENING; LDL GOAL LESS THAN 160     Trigger thumb of left hand     Bilateral low back pain without sciatica     Coronary artery disease involving native heart with angina pectoris, unspecified vessel or lesion type (H)     Bilateral carpal tunnel syndrome     Skin tag     Erectile dysfunction, unspecified erectile dysfunction type     Psoriasis       Current Medications -   Current Outpatient Medications:      amoxicillin (AMOXIL) 500 MG capsule, Take 1 capsule (500 mg) by mouth 2 times daily, Disp: 20 capsule, Rfl: 0     aspirin 81 MG chewable tablet, 81 mg, Disp: , Rfl:      atorvastatin (LIPITOR) 40 MG tablet, , Disp: , Rfl:      nitroglycerin (NITROSTAT) 0.4 MG sublingual tablet, Place 0.4 mg under the tongue, Disp: , Rfl:      sildenafil (REVATIO) 20 MG tablet, TAKE 1-5 TABLETS BY MOUTH ONE HOUR PRIOR TO SEXUAL ACTIVITY. DO NOT TAKE WITH NITROGLYCERIN, TERAZOSIN OR DOXAZOSIN., Disp: 30 tablet, Rfl: 1    Allergies -   Allergies   Allergen Reactions     No Known Allergies         Social History -   Social History     Socioeconomic History     Marital status:      Spouse name: Not on file     Number of children: Not on file     Years of education: Not on file     Highest education level: Not on file   Occupational History     Not on file   Social Needs     Financial resource strain: Not on file     Food insecurity     Worry: Not on file     Inability: Not on file     Transportation needs     Medical: Not on file     Non-medical: Not on file   Tobacco Use     Smoking status: Former Smoker     Packs/day: 0.50     Years: 20.00     Pack years: 10.00     Types: Cigarettes     Smokeless tobacco: Never Used   Substance and Sexual Activity     Alcohol use: No     Drug use: No     Sexual activity: Yes     Partners: Female   Lifestyle     Physical activity     Days per week: Not on file     Minutes per session: Not on file     Stress: Not on file   Relationships     Social connections     Talks on phone: Not on file     Gets together: Not on file     Attends Anabaptist service: Not on file     Active member of club or organization: Not on file     Attends meetings of clubs or organizations: Not on file     Relationship status: Not on file     Intimate partner violence     Fear of current or ex partner: Not on file     Emotionally abused: Not on file     Physically abused: Not on file     Forced sexual activity: Not on file   Other Topics Concern     Parent/sibling w/ CABG, MI or angioplasty before 65F 55M? Not Asked   Social History Narrative     Not on file       Family History -   Family History   Problem Relation Age of Onset     Diabetes Father      Kidney Disease Father        Review of Systems - As per HPI and PMHx, otherwise 7 system review is negative.    Physical Exam  /86   Pulse 69   Resp 16   SpO2 97%   General - The patient is in no distress.  Alert and oriented to person and place, answers questions and cooperates with examination appropriately.   Voice and Breathing  - The patient was breathing comfortably without the use of accessory muscles. There was no wheezing, stridor, or stertor.  The patients voice was clear and strong.  Ears - The auricles are normal. Left tympanic membrane intact. Small piece of cerumen on the inferior tympanic membrane. I used the otomicroscope and teased this off with a pick. I then grabbed it with an alligator. No effusion seen. No infection. Right ear normal. No effusion.   Eyes - Extraocular movements intact.  Sclera were not icteric or injected.  Neck - Soft, non-tender. Palpation of the occipital, submental, submandibular, internal jugular chain, and supraclavicular nodes did not demonstrate any abnormal lymph nodes or masses. Parotid glands had no masses. Palpation of the thyroid was soft and smooth, with no nodules or goiter appreciated.  The trachea was mobile and midline.  Neurological - Cranial nerves 2 through 12 were grossly intact. House-Brackmann grade 1 out of 6 bilaterally.       Audiologic Studies - An audiogram and tympanogram were performed today as part of the evaluation and personally reviewed. The tympanogram shows a normal Type A curve, with normal canal volume and middle ear pressure.  There is no sign of eustachian tube dysfunction or middle ear effusion.  The audiogram showed a significant down-sloping high frequency sensorineural hearing loss at 8K.       Assessment and Plan - Naren Berger is a 50 year old male who presents to me today with left ear plugging following AOM. Infection is gone. He had a crust on the tympanic membrane with a type As tymp. It removed the wax. Hopefully this helps. If not, it could be resolving effusion. He should continue ear popping and give this more time. Return if this fails, or contact me. We could try steroids.     He has sensorineural hearing loss, recheck hearing in 1-2 years.     Evelio Canseco MD  Otolaryngology  [facility]          Again, thank you for allowing me to  participate in the care of your patient.        Sincerely,        Evelio Canseco MD

## 2021-07-24 ENCOUNTER — LAB (OUTPATIENT)
Dept: LAB | Facility: CLINIC | Age: 51
End: 2021-07-24
Payer: COMMERCIAL

## 2021-07-24 DIAGNOSIS — I21.3 ST ELEVATION MYOCARDIAL INFARCTION (STEMI) (H): Primary | ICD-10-CM

## 2021-07-24 PROCEDURE — 80061 LIPID PANEL: CPT

## 2021-07-24 PROCEDURE — 36415 COLL VENOUS BLD VENIPUNCTURE: CPT

## 2021-07-26 LAB
CHOLEST SERPL-MCNC: 139 MG/DL
FASTING STATUS PATIENT QL REPORTED: YES
HDLC SERPL-MCNC: 38 MG/DL
LDLC SERPL CALC-MCNC: 70 MG/DL
NONHDLC SERPL-MCNC: 101 MG/DL
TRIGL SERPL-MCNC: 154 MG/DL

## 2021-09-05 ENCOUNTER — HEALTH MAINTENANCE LETTER (OUTPATIENT)
Age: 51
End: 2021-09-05

## 2021-12-26 ENCOUNTER — HEALTH MAINTENANCE LETTER (OUTPATIENT)
Age: 51
End: 2021-12-26

## 2022-01-24 ENCOUNTER — LAB (OUTPATIENT)
Dept: URGENT CARE | Facility: URGENT CARE | Age: 52
End: 2022-01-24
Payer: COMMERCIAL

## 2022-01-24 DIAGNOSIS — Z20.822 SUSPECTED COVID-19 VIRUS INFECTION: ICD-10-CM

## 2022-01-24 PROCEDURE — U0005 INFEC AGEN DETEC AMPLI PROBE: HCPCS

## 2022-01-24 PROCEDURE — U0003 INFECTIOUS AGENT DETECTION BY NUCLEIC ACID (DNA OR RNA); SEVERE ACUTE RESPIRATORY SYNDROME CORONAVIRUS 2 (SARS-COV-2) (CORONAVIRUS DISEASE [COVID-19]), AMPLIFIED PROBE TECHNIQUE, MAKING USE OF HIGH THROUGHPUT TECHNOLOGIES AS DESCRIBED BY CMS-2020-01-R: HCPCS

## 2022-01-25 ENCOUNTER — TELEPHONE (OUTPATIENT)
Dept: URGENT CARE | Facility: URGENT CARE | Age: 52
End: 2022-01-25
Payer: COMMERCIAL

## 2022-01-25 LAB — SARS-COV-2 RNA RESP QL NAA+PROBE: POSITIVE

## 2022-01-26 NOTE — TELEPHONE ENCOUNTER
Patient classified as COVID treatment eligible by Epic high risk algorithm:  Yes     Coronavirus (COVID-19) Notification    Reason for call  Notify of POSITIVE COVID-19 lab result, assess symptoms,  review [facility] recommendations    Lab Result   Lab test for 2019-nCoV rRt-PCR or SARS-COV-2 PCR  Oropharyngeal AND/OR nasopharyngeal swabs were POSITIVE for 2019-nCoV RNA [OR] SARS-COV-2 RNA (COVID-19) RNA     We have been unable to reach patient by phone at this time to notify of their Positive COVID-19 result.    Left voicemail message requesting a call back to [phone] [facility] for results.        A Positive COVID-19 letter will be sent via [employer] or the mail. (Exception, no letters sent to Presurgerical/Preprocedure Patients)    Tash Oliver

## 2022-05-16 ENCOUNTER — OFFICE VISIT (OUTPATIENT)
Dept: URGENT CARE | Facility: URGENT CARE | Age: 52
End: 2022-05-16
Payer: COMMERCIAL

## 2022-05-16 VITALS
HEART RATE: 83 BPM | WEIGHT: 249 LBS | BODY MASS INDEX: 41.44 KG/M2 | OXYGEN SATURATION: 95 % | DIASTOLIC BLOOD PRESSURE: 83 MMHG | RESPIRATION RATE: 18 BRPM | SYSTOLIC BLOOD PRESSURE: 143 MMHG | TEMPERATURE: 98.6 F

## 2022-05-16 DIAGNOSIS — S61.211A LACERATION OF LEFT INDEX FINGER WITHOUT DAMAGE TO NAIL, FOREIGN BODY PRESENCE UNSPECIFIED, INITIAL ENCOUNTER: ICD-10-CM

## 2022-05-16 DIAGNOSIS — S69.92XA HAND INJURY, LEFT, INITIAL ENCOUNTER: Primary | ICD-10-CM

## 2022-05-16 PROCEDURE — 90715 TDAP VACCINE 7 YRS/> IM: CPT | Performed by: FAMILY MEDICINE

## 2022-05-16 PROCEDURE — 90471 IMMUNIZATION ADMIN: CPT | Performed by: FAMILY MEDICINE

## 2022-05-16 PROCEDURE — 99213 OFFICE O/P EST LOW 20 MIN: CPT | Mod: 25 | Performed by: FAMILY MEDICINE

## 2022-05-17 NOTE — PROGRESS NOTES
Clinical Decision Making:    At the end of the encounter, I discussed results, diagnosis, medications. Discussed red flags for immediate return to clinic/ER, as well as indications for follow up if no improvement. Patient understood and agreed to plan. Patient was stable for discharge.      ICD-10-CM    1. Hand injury, left, initial encounter  S69.92XA    2. Laceration of finger  S61.219A INJECTION INTRAMUSCULAR OR SUB-Q     Tetanus updated with Adacel  Finger soaked while we called the emergency room  Continues to slowly make improvement even while he is here and we are seeing some splotches of pink distal to the DIP at this point.  At this point we have heard from the emergency room and a plastic surgeon that this case would likely be a watch and see type of case.  I discussed this with the patient and we decided to send him home for now.  If he develops worsening symptoms or increasing pain overnight he will proceed to the emergency room.  If he continues to make gradual improvement he can just continue to watch his progress and follow-up as needed.  If he over the next few days does not get complete resolution I did put in a referral to hand surgeon.  Patient is comfortable with this plan.      There are no Patient Instructions on file for this visit.   No follow-ups on file.      chief complaint    HPI:  Naren Berger is a 51 year old male who presents today complaining of accidental injury with a  to his left index finger today.  He accidentally turned on the  and the nozzle went right into his left pointer finger.  There is a small opening or laceration on the palmar aspect just proximal to the DIP.  He reports that the finger is very painful and initially it was numb and white and swollen from the first knuckle on down.  Over the last 2 hours he has regained color and sensation between the first knuckle and second knuckle.  No other injuries    Review of chart shows his last  tetanus was done in 2009    History obtained from patient and chart review.    Problem List:  2018-05: Psoriasis  2017-12: Skin tag  2017-12: Erectile dysfunction, unspecified erectile dysfunction type  2017-07: Carpal tunnel syndrome of left wrist  2017-07: Coronary artery disease involving native heart with angina   pectoris, unspecified vessel or lesion type (H)  2017-07: Bilateral carpal tunnel syndrome  2015-10: Bilateral low back pain without sciatica  2013-07: Trigger thumb of left hand  2010-10: CARDIOVASCULAR SCREENING; LDL GOAL LESS THAN 160      Past Medical History:   Diagnosis Date     CAD (coronary artery disease)     MI 2016     NO ACTIVE PROBLEMS        Social History     Tobacco Use     Smoking status: Former Smoker     Packs/day: 0.50     Years: 20.00     Pack years: 10.00     Types: Cigarettes     Smokeless tobacco: Never Used   Substance Use Topics     Alcohol use: No       Review of systems  negative except listed in HPI    Vitals:    05/16/22 1935 05/16/22 1941   BP: (!) 159/92 (!) 143/83   Pulse: 83    Resp: 18    Temp: 98.6  F (37  C)    SpO2: 95%    Weight: 112.9 kg (249 lb)        Physical Exam  Vitals noted and within normal except for elevated blood pressure  Left hand radial pulses normal  Patient is able to flex and extend the index finger although flexion is limited due to swelling  There is white discoloration distal to his DIP joint and patient reports numbness in this area  There is a small 2 mm x 5 mm laceration on the palmar aspect of his left hand pointer finger just proximal to the DIP  There are some erythema of the skin from the PIP to DIP and mild tenderness.  Sensation is intact in this zone.

## 2022-05-17 NOTE — PATIENT INSTRUCTIONS
If worsening tonight - to [facility]    If having ongoing symptoms or not getting completely resolved then see hand surgeon (referral done)

## 2022-05-17 NOTE — PROGRESS NOTES
Prior to immunization administration, verified patients identity using patient s name and date of birth. Please see Immunization Activity for additional information.     Screening Questionnaire for Adult Immunization    Are you sick today?   No   Do you have allergies to medications, food, a vaccine component or latex?   No   Have you ever had a serious reaction after receiving a vaccination?   No   Do you have a long-term health problem with heart, lung, kidney, or metabolic disease (e.g., diabetes), asthma, a blood disorder, no spleen, complement component deficiency, a cochlear implant, or a spinal fluid leak?  Are you on long-term aspirin therapy?   No   Do you have cancer, leukemia, HIV/AIDS, or any other immune system problem?   No   Do you have a parent, brother, or sister with an immune system problem?   No   In the past 3 months, have you taken medications that affect  your immune system, such as prednisone, other steroids, or anticancer drugs; drugs for the treatment of rheumatoid arthritis, Crohn s disease, or psoriasis; or have you had radiation treatments?   No   Have you had a seizure, or a brain or other nervous system problem?   No   During the past year, have you received a transfusion of blood or blood    products, or been given immune (gamma) globulin or antiviral drug?   No   For women: Are you pregnant or is there a chance you could become       pregnant during the next month?   No   Have you received any vaccinations in the past 4 weeks?   No     Immunization questionnaire answers were all negative.        Per orders of Maria Griffith , injection of TDAP given by Jensen Contreras CMA. Patient instructed to remain in clinic for 15 minutes afterwards, and to report any adverse reaction to me immediately.       Screening performed by Jensen Contreras CMA on 5/16/2022 at 7:52 PM.Clinic Administered Medication Documentation

## 2022-09-22 DIAGNOSIS — N52.9 ERECTILE DYSFUNCTION, UNSPECIFIED ERECTILE DYSFUNCTION TYPE: ICD-10-CM

## 2022-09-22 RX ORDER — SILDENAFIL CITRATE 20 MG/1
TABLET ORAL
Qty: 30 TABLET | Refills: 1 | OUTPATIENT
Start: 2022-09-22

## 2022-09-22 NOTE — TELEPHONE ENCOUNTER
Denied.     I haven't seen patient in clinic for couple years.   I recommend that he confirm that taking this medication is ok by his cardiologist also.     Louis Colon PA-C

## 2022-09-22 NOTE — TELEPHONE ENCOUNTER
Called and spoke with patient. Patient will call his cardiology to confirm medication is okay to take and will give us a call back to schedule an appt.   Katherine Agrawal,     [facility]

## 2022-10-23 ENCOUNTER — HEALTH MAINTENANCE LETTER (OUTPATIENT)
Age: 52
End: 2022-10-23

## 2023-03-02 ENCOUNTER — VIRTUAL VISIT (OUTPATIENT)
Dept: URGENT CARE | Facility: CLINIC | Age: 53
End: 2023-03-02
Payer: COMMERCIAL

## 2023-03-02 ENCOUNTER — OFFICE VISIT (OUTPATIENT)
Dept: FAMILY MEDICINE | Facility: CLINIC | Age: 53
End: 2023-03-02
Payer: COMMERCIAL

## 2023-03-02 VITALS
HEIGHT: 65 IN | TEMPERATURE: 96.9 F | DIASTOLIC BLOOD PRESSURE: 83 MMHG | HEART RATE: 81 BPM | OXYGEN SATURATION: 96 % | SYSTOLIC BLOOD PRESSURE: 139 MMHG | BODY MASS INDEX: 41.82 KG/M2 | WEIGHT: 251 LBS | RESPIRATION RATE: 16 BRPM

## 2023-03-02 DIAGNOSIS — H60.92 OTITIS EXTERNA OF LEFT EAR, UNSPECIFIED CHRONICITY, UNSPECIFIED TYPE: Primary | ICD-10-CM

## 2023-03-02 DIAGNOSIS — H92.09 EARACHE: Primary | ICD-10-CM

## 2023-03-02 DIAGNOSIS — E66.01 MORBID OBESITY (H): ICD-10-CM

## 2023-03-02 PROCEDURE — 99207 PR NO CHARGE LOS: CPT | Mod: VID

## 2023-03-02 PROCEDURE — 99213 OFFICE O/P EST LOW 20 MIN: CPT | Performed by: PHYSICIAN ASSISTANT

## 2023-03-02 RX ORDER — NEOMYCIN SULFATE, POLYMYXIN B SULFATE AND HYDROCORTISONE 10; 3.5; 1 MG/ML; MG/ML; [USP'U]/ML
3 SUSPENSION/ DROPS AURICULAR (OTIC) 3 TIMES DAILY
Qty: 4 ML | Refills: 0 | Status: SHIPPED | OUTPATIENT
Start: 2023-03-02 | End: 2023-03-09

## 2023-03-02 RX ORDER — NEOMYCIN/POLYMYXIN B/HYDROCORT 3.5-10K-1
1-2 SUSPENSION, DROPS(FINAL DOSAGE FORM)(ML) OPHTHALMIC (EYE) 3 TIMES DAILY
Qty: 2.1 ML | Refills: 0 | Status: SHIPPED | OUTPATIENT
Start: 2023-03-02 | End: 2023-03-09

## 2023-03-02 ASSESSMENT — PAIN SCALES - GENERAL: PAINLEVEL: NO PAIN (0)

## 2023-03-02 NOTE — PROGRESS NOTES
"  Assessment & Plan       ICD-10-CM    1. Otitis externa of left ear, unspecified chronicity, unspecified type  H60.92 neomycin-polymyxin-hydrocortisone (CORTISPORIN) 3.5-32279-0 otic suspension      2. Morbid obesity (H)  E66.01         Talk to patient about his concerns.  We will get with her on some eardrops.  We talked about avoiding any Q-tips in his left ear.  We will recheck in a week or sooner if needed.  Warning signs were discussed.    Return in about 1 week (around 3/9/2023) for recheck, As Needed.    CHRIS Jasmine [facility]   Lb is a 52 year old accompanied by his self, presenting for the following health issues:  Ear Problem      HPI     Concern - ear  Onset: couple days  Description: painful  Intensity: moderate  Progression of Symptoms:  same  Accompanying Signs & Symptoms: pain  Previous history of similar problem: yes  Precipitating factors:        Worsened by: n/a  Alleviating factors:        Improved by: n/a  Therapies tried and outcome:  none   Had been scratching in his left ear and now painful.   No recent colds or illnesses.       Review of Systems   Constitutional, HEENT, cardiovascular, pulmonary, gi and gu systems are negative, except as otherwise noted.      Objective    /83   Pulse 81   Temp 96.9  F (36.1  C) (Tympanic)   Resp 16   Ht 1.651 m (5' 5\")   Wt 113.9 kg (251 lb)   SpO2 96%   BMI 41.77 kg/m    Body mass index is 41.77 kg/m .  Physical Exam   GENERAL: healthy, alert and no distress  Head: Normocephalic, atraumatic.  Eyes: Conjunctiva clear, non icteric. PERRLA.  Ears: External canal on left is very swollen and erythematous.  I could visualize a portion of his TM and it looked normal.  His right ear is normal.  Mouth / Throat: Normal dentition.  No oral lesions. Pharynx non erythematous, tonsils without hypertrophy.  Neck: Supple, no enlarged LN, trachea midline.   RESP: lungs clear to auscultation - no rales, " rhonchi or wheezes  CV: regular rate and rhythm, normal S1 S2, no S3 or S4, no murmur, click or rub, no peripheral edema

## 2023-03-30 ENCOUNTER — MYC MEDICAL ADVICE (OUTPATIENT)
Dept: FAMILY MEDICINE | Facility: CLINIC | Age: 53
End: 2023-03-30
Payer: COMMERCIAL

## 2023-03-31 ENCOUNTER — VIRTUAL VISIT (OUTPATIENT)
Dept: FAMILY MEDICINE | Facility: CLINIC | Age: 53
End: 2023-03-31
Payer: COMMERCIAL

## 2023-03-31 DIAGNOSIS — I25.119 CORONARY ARTERY DISEASE INVOLVING NATIVE HEART WITH ANGINA PECTORIS, UNSPECIFIED VESSEL OR LESION TYPE (H): ICD-10-CM

## 2023-03-31 DIAGNOSIS — N52.9 ERECTILE DYSFUNCTION, UNSPECIFIED ERECTILE DYSFUNCTION TYPE: Primary | ICD-10-CM

## 2023-03-31 PROCEDURE — 99213 OFFICE O/P EST LOW 20 MIN: CPT | Mod: VID | Performed by: PHYSICIAN ASSISTANT

## 2023-03-31 RX ORDER — SILDENAFIL CITRATE 20 MG/1
TABLET ORAL
Qty: 30 TABLET | Refills: 3 | Status: SHIPPED | OUTPATIENT
Start: 2023-03-31

## 2023-03-31 RX ORDER — TADALAFIL 10 MG/1
10-20 TABLET ORAL DAILY PRN
Qty: 30 TABLET | Refills: 1 | Status: SHIPPED | OUTPATIENT
Start: 2023-03-31

## 2023-03-31 NOTE — PROGRESS NOTES
"Lb is a 52 year old who is being evaluated via a billable video visit.      How would you like to obtain your AVS? MyChart  If the video visit is dropped, the invitation should be resent by: Send to e-mail at: [email or web address]  Will anyone else be joining your video visit? No          Assessment & Plan       ICD-10-CM    1. Erectile dysfunction, unspecified erectile dysfunction type  N52.9 tadalafil (CIALIS) 10 MG tablet     sildenafil (REVATIO) 20 MG tablet      2. Coronary artery disease involving native heart with angina pectoris, unspecified vessel or lesion type (H)  I25.119 Lipid panel reflex to direct LDL Fasting     Comprehensive metabolic panel (BMP + Alb, Alk Phos, ALT, AST, Total. Bili, TP)        1.  A prescription for both sildenafil and Cialis was given he is going to try them both and see which one works better for him.  Warning signs side effects were discussed.  2.  Labs were ordered he will make a lab appointment when he is able so he has also ready for his cardiology appointment in June.         BMI:   Estimated body mass index is 41.77 kg/m  as calculated from the following:    Height as of 3/2/23: 1.651 m (5' 5\").    Weight as of 3/2/23: 113.9 kg (251 lb).   Louis Colon PA-C  [facility]   Lb is a 52 year old, presenting for the following health issues:  Refill Request    Additional Questions 3/31/2023   Roomed by Sana     History of Present Illness       Reason for visit:  ED    He eats 0-1 servings of fruits and vegetables daily.He consumes 0 sweetened beverage(s) daily.He exercises with enough effort to increase his heart rate 20 to 29 minutes per day.  He exercises with enough effort to increase his heart rate 4 days per week.   He is taking medications regularly.  He has a history of CAD and sees cardiology yearly.  He states he has an appointment in June and usually gets his lab work done through his primary provider and has " faxed over to his cardiologist.    Also has a history of erectile dysfunction and has been on sildenafil using 2 or 3- 20 mg tablets at a time and does not always work for him.  He is inquiring about Cialis also if he can try that up.    Review of Systems   Constitutional, HEENT, cardiovascular, pulmonary, gi and gu systems are negative, except as otherwise noted.      Objective    Vitals - Patient Reported  Pain Score: No Pain (0)      Vitals:  No vitals were obtained today due to virtual visit.    Physical Exam   GENERAL: Healthy, alert and no distress  EYES: Eyes grossly normal to inspection.  No discharge or erythema, or obvious scleral/conjunctival abnormalities.  RESP: No audible wheeze, cough, or visible cyanosis.  No visible retractions or increased work of breathing.    SKIN: Visible skin clear. No significant rash, abnormal pigmentation or lesions.  NEURO: Cranial nerves grossly intact.  Mentation and speech appropriate for age.  PSYCH: Mentation appears normal, affect normal/bright, judgement and insight intact, normal speech and appearance well-groomed.    Labs pending          Video-Visit Details    Type of service:  Video Visit     Originating Location (pt. Location): Home    Distant Location (provider location):  Off-site  Platform used for Video Visit: Juaquin

## 2023-04-02 ENCOUNTER — HEALTH MAINTENANCE LETTER (OUTPATIENT)
Age: 53
End: 2023-04-02

## 2023-06-12 ENCOUNTER — LAB (OUTPATIENT)
Dept: LAB | Facility: CLINIC | Age: 53
End: 2023-06-12
Payer: COMMERCIAL

## 2023-06-12 DIAGNOSIS — I25.119 CORONARY ARTERY DISEASE INVOLVING NATIVE HEART WITH ANGINA PECTORIS, UNSPECIFIED VESSEL OR LESION TYPE (H): ICD-10-CM

## 2023-06-12 LAB
ALBUMIN SERPL BCG-MCNC: 4.5 G/DL (ref 3.5–5.2)
ALP SERPL-CCNC: 106 U/L (ref 40–129)
ALT SERPL W P-5'-P-CCNC: 30 U/L (ref 10–50)
ANION GAP SERPL CALCULATED.3IONS-SCNC: 10 MMOL/L (ref 7–15)
AST SERPL W P-5'-P-CCNC: 35 U/L (ref 10–50)
BILIRUB SERPL-MCNC: 0.4 MG/DL
BUN SERPL-MCNC: 11.3 MG/DL (ref 6–20)
CALCIUM SERPL-MCNC: 9.5 MG/DL (ref 8.6–10)
CHLORIDE SERPL-SCNC: 103 MMOL/L (ref 98–107)
CHOLEST SERPL-MCNC: 123 MG/DL
CREAT SERPL-MCNC: 0.81 MG/DL (ref 0.67–1.17)
DEPRECATED HCO3 PLAS-SCNC: 24 MMOL/L (ref 22–29)
GFR SERPL CREATININE-BSD FRML MDRD: >90 ML/MIN/1.73M2
GLUCOSE SERPL-MCNC: 89 MG/DL (ref 70–99)
HDLC SERPL-MCNC: 46 MG/DL
LDLC SERPL CALC-MCNC: 49 MG/DL
NONHDLC SERPL-MCNC: 77 MG/DL
POTASSIUM SERPL-SCNC: 4.2 MMOL/L (ref 3.4–5.3)
PROT SERPL-MCNC: 7.6 G/DL (ref 6.4–8.3)
SODIUM SERPL-SCNC: 137 MMOL/L (ref 136–145)
TRIGL SERPL-MCNC: 140 MG/DL

## 2023-06-12 PROCEDURE — 80061 LIPID PANEL: CPT

## 2023-06-12 PROCEDURE — 80053 COMPREHEN METABOLIC PANEL: CPT

## 2023-06-12 PROCEDURE — 36415 COLL VENOUS BLD VENIPUNCTURE: CPT

## 2023-06-13 NOTE — RESULT ENCOUNTER NOTE
MrJuany Ab,    All of your labs were normal/near normal for you.    Please contact the clinic if you have additional questions.  Thank you.    Sincerely,    Louis Colon PA-C

## 2023-12-11 ENCOUNTER — OFFICE VISIT (OUTPATIENT)
Dept: URGENT CARE | Facility: URGENT CARE | Age: 53
End: 2023-12-11
Payer: COMMERCIAL

## 2023-12-11 VITALS
BODY MASS INDEX: 42.93 KG/M2 | WEIGHT: 258 LBS | SYSTOLIC BLOOD PRESSURE: 134 MMHG | OXYGEN SATURATION: 95 % | HEART RATE: 96 BPM | RESPIRATION RATE: 18 BRPM | TEMPERATURE: 98.7 F | DIASTOLIC BLOOD PRESSURE: 84 MMHG

## 2023-12-11 DIAGNOSIS — H60.391 INFECTIVE OTITIS EXTERNA, RIGHT: Primary | ICD-10-CM

## 2023-12-11 DIAGNOSIS — J06.9 VIRAL URI WITH COUGH: ICD-10-CM

## 2023-12-11 DIAGNOSIS — Z87.2 HISTORY OF PSORIASIS: ICD-10-CM

## 2023-12-11 PROCEDURE — 99213 OFFICE O/P EST LOW 20 MIN: CPT | Performed by: NURSE PRACTITIONER

## 2023-12-11 RX ORDER — CIPROFLOXACIN AND DEXAMETHASONE 3; 1 MG/ML; MG/ML
4 SUSPENSION/ DROPS AURICULAR (OTIC) 2 TIMES DAILY
Qty: 7.5 ML | Refills: 0 | Status: SHIPPED | OUTPATIENT
Start: 2023-12-11

## 2023-12-11 ASSESSMENT — ENCOUNTER SYMPTOMS
FEVER: 0
COUGH: 1

## 2023-12-11 NOTE — PATIENT INSTRUCTIONS
Start Ciprodex for swimmer's ear.    Lie on your side for at least 60 seconds after using the drops.    Tylenol or ibuprofen as needed for pain.    Keep ear dry.  Do not dunk underwater or swim until drops are completed.  You may use a cotton ball and avoid water directly in the ear with baths.    If you feel like the drops are not getting to the back of her ear and she has a lot of debris/wax in the way, you may need to come back to have some of this removed.

## 2023-12-11 NOTE — PROGRESS NOTES
Assessment & Plan     History of psoriasis      Infective otitis externa, right    - ciprofloxacin-dexAMETHasone (CIPRODEX) 0.3-0.1 % otic suspension  Dispense: 7.5 mL; Refill: 0    Viral URI with cough         Focused exam and history done due to COVID-19 pandemic in a walk-in setting.      History, exam, and vital signs consistent with a viral URI, resolving associated with ear pain started 2 days ago.  Does have a history of psoriasis and says he did scratch the external ear canal with some bleeding and then developed symptoms 3 to 4 days after that.  Has a history of otitis externa for the same reason.      No red flags.     OTCs recommended: None [   ].  Dextromethorphan  [ x ], guaifenesin [  ], pseudoephedrine [   ], Afrin for no greater than 3 days [  ], Tylenol or ibuprofen [  ].                Return in about 3 days (around 12/14/2023) for If no better.    Maria Longoria [facility] [place]    Subjective     Lb is a 52 year old male who presents to clinic today for the following health issues:  Chief Complaint   Patient presents with    Urgent Care    Cough     Per patient symptoms started a couple of days ago right ear pain and pressure      HPI    Patient with a history of CAD with cough starting about a week ago associated with right ear pain 2 days ago.  Has a history of psoriasis and scratch the outside of the ear where he does get psoriasis causing it to bleed.  Has had otitis externa as a result of this in the past.  Feels similarly.  Pain is 3 out of 10 and 5 out of 10 with touching or lying on the affected side.    Cough is improving.        Review of Systems   Constitutional:  Negative for fever.   Respiratory:  Positive for cough.            Objective    /84   Pulse 96   Temp 98.7  F (37.1  C) (Tympanic)   Resp 18   Wt 117 kg (258 lb)   SpO2 95%   BMI 42.93 kg/m    Physical Exam  Constitutional:       Appearance: Normal appearance. He is not  ill-appearing.   HENT:      Right Ear: Tympanic membrane normal. Swelling (Mild) and tenderness (To tragus) present.      Left Ear: Tympanic membrane normal. No swelling or tenderness.   Pulmonary:      Effort: Pulmonary effort is normal.      Breath sounds: Normal breath sounds.   Musculoskeletal:         General: Normal range of motion.   Skin:     General: Skin is warm.   Neurological:      Mental Status: He is alert.   Psychiatric:         Mood and Affect: Mood normal.

## 2024-02-02 ENCOUNTER — OFFICE VISIT (OUTPATIENT)
Dept: URGENT CARE | Facility: URGENT CARE | Age: 54
End: 2024-02-02
Payer: COMMERCIAL

## 2024-02-02 VITALS
DIASTOLIC BLOOD PRESSURE: 100 MMHG | OXYGEN SATURATION: 96 % | RESPIRATION RATE: 18 BRPM | WEIGHT: 260 LBS | SYSTOLIC BLOOD PRESSURE: 158 MMHG | BODY MASS INDEX: 43.27 KG/M2 | HEART RATE: 89 BPM | TEMPERATURE: 98.3 F

## 2024-02-02 DIAGNOSIS — S39.92XA INJURY OF COCCYX, INITIAL ENCOUNTER: Primary | ICD-10-CM

## 2024-02-02 PROCEDURE — 99213 OFFICE O/P EST LOW 20 MIN: CPT | Performed by: PHYSICIAN ASSISTANT

## 2024-02-02 ASSESSMENT — PAIN SCALES - GENERAL: PAINLEVEL: SEVERE PAIN (6)

## 2024-02-02 NOTE — PATIENT INSTRUCTIONS
Heat  Ice  Sit on soft surfaces  Take Tylenol or an NSAID such as ibuprofen or naproxen as needed for pain.  May take up to 1000 mg of Tylenol every 6-8 hours- do not exceed 4000 mg in 24 hours.  May take up to 600 mg ibuprofen every 6-8 hours.  Alternate Tylenol and Ibuprofen every 4 hours.

## 2024-02-02 NOTE — PROGRESS NOTES
Assessment & Plan     Injury of coccyx, initial encounter  We discussed symptomatic measures including Tylenol, ibuprofen, heat, ice, avoiding direct pressure.  Expect symptoms to last 4 to 6 weeks, possibly longer.  If symptoms worsen or fail to improve after 8 weeks, be seen for further evaluation.    Return in about 6 weeks (around 3/15/2024) for visit with primary care provider if not improving.     Marianne Manzano PA-C  [facility]    Subjective   Naren Berger is a 53 year old who presents for the following health issues     Patient presents with:  Urgent Care  Fall: Per patient states on Sunday while taking his boys ice fishing he slipped and fell on his butt since then has been having low back pain feels his tail bone is broken       HPI    Lb presents clinic today for evaluation of pain in his tailbone.  He was treated 1 week ago when he slipped and fell straight on his tailbone.  He did have some pain in his lower back but his wife manipulated this back into position.  No pain in his low back, just point tenderness in his tailbone.  This is worst when moving from a seated to a standing position or when pressing directly on his coccyx.  No radicular symptoms, no shooting pain in his legs, no loss of bowel or bladder control.    Review of Systems   ROS negative except as stated above.      Objective    BP (!) 158/100   Pulse 89   Temp 98.3  F (36.8  C) (Tympanic)   Resp 18   Wt 117.9 kg (260 lb)   SpO2 96%   BMI 43.27 kg/m    Physical Exam   GENERAL: alert and no distress  MS: Significant pain moving from a seated to standing position.  Tenderness with palpation over coccyx    No results found for any visits on 02/02/24.

## 2024-06-08 ENCOUNTER — HEALTH MAINTENANCE LETTER (OUTPATIENT)
Age: 54
End: 2024-06-08

## 2024-07-24 DIAGNOSIS — E78.5 HYPERLIPEMIA: Primary | ICD-10-CM

## 2024-08-12 ENCOUNTER — LAB (OUTPATIENT)
Dept: LAB | Facility: CLINIC | Age: 54
End: 2024-08-12
Payer: COMMERCIAL

## 2024-08-12 DIAGNOSIS — E78.5 HYPERLIPEMIA: ICD-10-CM

## 2024-08-12 PROCEDURE — 80061 LIPID PANEL: CPT

## 2024-08-12 PROCEDURE — 36415 COLL VENOUS BLD VENIPUNCTURE: CPT

## 2024-08-13 LAB
CHOLEST SERPL-MCNC: 122 MG/DL
FASTING STATUS PATIENT QL REPORTED: YES
HDLC SERPL-MCNC: 41 MG/DL
LDLC SERPL CALC-MCNC: 54 MG/DL
NONHDLC SERPL-MCNC: 81 MG/DL
TRIGL SERPL-MCNC: 135 MG/DL

## 2024-09-30 ENCOUNTER — OFFICE VISIT (OUTPATIENT)
Dept: URGENT CARE | Facility: URGENT CARE | Age: 54
End: 2024-09-30
Payer: COMMERCIAL

## 2024-09-30 VITALS
WEIGHT: 254.4 LBS | OXYGEN SATURATION: 96 % | BODY MASS INDEX: 42.33 KG/M2 | RESPIRATION RATE: 15 BRPM | TEMPERATURE: 97.3 F | DIASTOLIC BLOOD PRESSURE: 84 MMHG | HEART RATE: 71 BPM | SYSTOLIC BLOOD PRESSURE: 130 MMHG

## 2024-09-30 DIAGNOSIS — J01.90 ACUTE BACTERIAL RHINOSINUSITIS: Primary | ICD-10-CM

## 2024-09-30 DIAGNOSIS — B96.89 ACUTE BACTERIAL RHINOSINUSITIS: Primary | ICD-10-CM

## 2024-09-30 DIAGNOSIS — R03.0 ELEVATED BLOOD PRESSURE READING WITHOUT DIAGNOSIS OF HYPERTENSION: ICD-10-CM

## 2024-09-30 DIAGNOSIS — I21.02 ST ELEVATION (STEMI) MYOCARDIAL INFARCTION INVOLVING LEFT ANTERIOR DESCENDING CORONARY ARTERY (H): ICD-10-CM

## 2024-09-30 PROBLEM — G47.33 OSA (OBSTRUCTIVE SLEEP APNEA): Status: ACTIVE | Noted: 2019-10-07

## 2024-09-30 PROCEDURE — 99213 OFFICE O/P EST LOW 20 MIN: CPT | Performed by: NURSE PRACTITIONER

## 2024-09-30 RX ORDER — ROSUVASTATIN CALCIUM 40 MG/1
1 TABLET, COATED ORAL DAILY
COMMUNITY
Start: 2024-07-11

## 2024-09-30 NOTE — PROGRESS NOTES
Assessment & Plan     1. Acute bacterial rhinosinusitis    - amoxicillin-clavulanate (AUGMENTIN) 875-125 MG tablet; Take 1 tablet by mouth 2 times daily for 7 days.  Dispense: 14 tablet; Refill: 0    2. Elevated blood pressure reading without diagnosis of hypertension      3. ST elevation (STEMI) myocardial infarction involving left anterior descending coronary artery (H)    Recheck of patient blood pressure in clinic was normal first check was slightly elevated.  Patient with a history of STEMI discussed close monitoring of blood pressure at home during times of illness.  Home care reviewed. Patient verbalized understanding; will monitor symptoms closely. Reviewed s/e to medications.   Follow up with primary care in 1 week if symptoms not improving.     Handout given from epic and reviewed.      GEORGINA Valerio [facility]     Lb is a 53 year old male who presents to clinic today for the following health issues:  Chief Complaint   Patient presents with    Sinus Problem     Sinus pressure and drainage, green phlegm, shortness of breath with activity. Sx started Thursday.       HPI    Patient presents to clinic states that he has been sick for the last several weeks states felt he was getting better and then started to have sinus pressure drainage headache the symptoms started approximately 4 days ago.  URI Adult    Onset of symptoms was 4 day(s) ago.  Course of illness is worsening.    Severity moderate  Current and Associated symptoms: runny nose, stuffy nose, and facial pain/pressure  Treatment measures tried include Tylenol/Ibuprofen, Fluids, and Rest.  Predisposing factors include None.      Review of Systems  Constitutional, HEENT, cardiovascular, pulmonary, gi and gu systems are negative, except as otherwise noted.      Objective    BP (!) 158/85 (BP Location: Left arm, Cuff Size: Adult Regular)   Pulse 71   Temp 97.3  F (36.3  C) (Tympanic)   Resp 15    Wt 115.4 kg (254 lb 6.4 oz)   SpO2 96%   BMI 42.33 kg/m    Physical Exam   GENERAL: alert and no distress  EYES: Eyes grossly normal to inspection, PERRL and conjunctivae and sclerae normal  HENT: normal cephalic/atraumatic, ear canals and TM's normal, nose and mouth without ulcers or lesions, nasal mucosa edematous , rhinorrhea yellow and green, oropharynx clear, oral mucous membranes moist, and sinuses: maxillary, frontal tenderness on bilateral  NECK: bilateral anterior cervical adenopathy, no asymmetry, masses, or scars, and thyroid normal to palpation  RESP: lungs clear to auscultation - no rales, rhonchi or wheezes  CV: regular rate and rhythm, normal S1 S2, no S3 or S4, no murmur, click or rub, no peripheral edema  ABDOMEN: soft, nontender, no hepatosplenomegaly, no masses and bowel sounds normal  MS: no gross musculoskeletal defects noted, no edema

## 2024-10-12 ENCOUNTER — OFFICE VISIT (OUTPATIENT)
Dept: URGENT CARE | Facility: URGENT CARE | Age: 54
End: 2024-10-12
Payer: COMMERCIAL

## 2024-10-12 VITALS
RESPIRATION RATE: 18 BRPM | OXYGEN SATURATION: 92 % | BODY MASS INDEX: 42.63 KG/M2 | HEART RATE: 71 BPM | TEMPERATURE: 97 F | WEIGHT: 256.2 LBS | DIASTOLIC BLOOD PRESSURE: 90 MMHG | SYSTOLIC BLOOD PRESSURE: 152 MMHG

## 2024-10-12 DIAGNOSIS — R07.9 CHEST PAIN, UNSPECIFIED TYPE: Primary | ICD-10-CM

## 2024-10-12 PROCEDURE — 99207 PR NO CHARGE LOS: CPT | Performed by: FAMILY MEDICINE

## 2024-10-12 NOTE — PROGRESS NOTES
53-year-old male presented with ongoing chest pain, shortness of breath and sinus congestion.  History of ischemic heart disease and high blood pressure.  Patient was treated for suspected sinus infection recently.  Physical examination overall unremarkable.  Differentials discussed in detail including coronary ischemia.  Needs comprehensive evaluation to delineate specific diagnosis.  Recommended to go to ER for further evaluation and management.  All questions answered.      Dr Candelaria

## 2024-12-04 ENCOUNTER — MYC MEDICAL ADVICE (OUTPATIENT)
Dept: FAMILY MEDICINE | Facility: CLINIC | Age: 54
End: 2024-12-04
Payer: COMMERCIAL

## 2024-12-29 SDOH — HEALTH STABILITY: PHYSICAL HEALTH: ON AVERAGE, HOW MANY MINUTES DO YOU ENGAGE IN EXERCISE AT THIS LEVEL?: 40 MIN

## 2024-12-29 SDOH — HEALTH STABILITY: PHYSICAL HEALTH: ON AVERAGE, HOW MANY DAYS PER WEEK DO YOU ENGAGE IN MODERATE TO STRENUOUS EXERCISE (LIKE A BRISK WALK)?: 3 DAYS

## 2024-12-29 ASSESSMENT — SOCIAL DETERMINANTS OF HEALTH (SDOH): HOW OFTEN DO YOU GET TOGETHER WITH FRIENDS OR RELATIVES?: ONCE A WEEK

## 2024-12-31 ENCOUNTER — OFFICE VISIT (OUTPATIENT)
Dept: FAMILY MEDICINE | Facility: CLINIC | Age: 54
End: 2024-12-31
Payer: COMMERCIAL

## 2024-12-31 VITALS
DIASTOLIC BLOOD PRESSURE: 72 MMHG | SYSTOLIC BLOOD PRESSURE: 136 MMHG | WEIGHT: 256 LBS | HEART RATE: 88 BPM | BODY MASS INDEX: 42.65 KG/M2 | TEMPERATURE: 96 F | HEIGHT: 65 IN | RESPIRATION RATE: 16 BRPM | OXYGEN SATURATION: 95 %

## 2024-12-31 DIAGNOSIS — I25.119 CORONARY ARTERY DISEASE INVOLVING NATIVE HEART WITH ANGINA PECTORIS, UNSPECIFIED VESSEL OR LESION TYPE (H): ICD-10-CM

## 2024-12-31 DIAGNOSIS — Z00.00 ROUTINE GENERAL MEDICAL EXAMINATION AT A HEALTH CARE FACILITY: Primary | ICD-10-CM

## 2024-12-31 DIAGNOSIS — E66.01 MORBID OBESITY (H): ICD-10-CM

## 2024-12-31 PROCEDURE — 99213 OFFICE O/P EST LOW 20 MIN: CPT | Mod: 25 | Performed by: PHYSICIAN ASSISTANT

## 2024-12-31 PROCEDURE — 99396 PREV VISIT EST AGE 40-64: CPT | Performed by: PHYSICIAN ASSISTANT

## 2024-12-31 RX ORDER — CLOPIDOGREL BISULFATE 75 MG/1
75 TABLET ORAL DAILY
COMMUNITY
Start: 2024-10-22

## 2024-12-31 ASSESSMENT — PAIN SCALES - GENERAL: PAINLEVEL_OUTOF10: NO PAIN (0)

## 2024-12-31 NOTE — PATIENT INSTRUCTIONS
Patient Education   Preventive Care Advice   This is general advice given by our system to help you stay healthy. However, your care team may have specific advice just for you. Please talk to your care team about your preventive care needs.  Nutrition  Eat 5 or more servings of fruits and vegetables each day.  Try wheat bread, brown rice and whole grain pasta (instead of white bread, rice, and pasta).  Get enough calcium and vitamin D. Check the label on foods and aim for 100% of the RDA (recommended daily allowance).  Lifestyle  Exercise at least 150 minutes each week  (30 minutes a day, 5 days a week).  Do muscle strengthening activities 2 days a week. These help control your weight and prevent disease.  No smoking.  Wear sunscreen to prevent skin cancer.  Have a dental exam and cleaning every 6 months.  Yearly exams  See your health care team every year to talk about:  Any changes in your health.  Any medicines your care team has prescribed.  Preventive care, family planning, and ways to prevent chronic diseases.  Shots (vaccines)   HPV shots (up to age 26), if you've never had them before.  Hepatitis B shots (up to age 59), if you've never had them before.  COVID-19 shot: Get this shot when it's due.  Flu shot: Get a flu shot every year.  Tetanus shot: Get a tetanus shot every 10 years.  Pneumococcal, hepatitis A, and RSV shots: Ask your care team if you need these based on your risk.  Shingles shot (for age 50 and up)  General health tests  Diabetes screening:  Starting at age 35, Get screened for diabetes at least every 3 years.  If you are younger than age 35, ask your care team if you should be screened for diabetes.  Cholesterol test: At age 39, start having a cholesterol test every 5 years, or more often if advised.  Bone density scan (DEXA): At age 50, ask your care team if you should have this scan for osteoporosis (brittle bones).  Hepatitis C: Get tested at least once in your life.  STIs (sexually  transmitted infections)  Before age 24: Ask your care team if you should be screened for STIs.  After age 24: Get screened for STIs if you're at risk. You are at risk for STIs (including HIV) if:  You are sexually active with more than one person.  You don't use condoms every time.  You or a partner was diagnosed with a sexually transmitted infection.  If you are at risk for HIV, ask about PrEP medicine to prevent HIV.  Get tested for HIV at least once in your life, whether you are at risk for HIV or not.  Cancer screening tests  Cervical cancer screening: If you have a cervix, begin getting regular cervical cancer screening tests starting at age 21.  Breast cancer scan (mammogram): If you've ever had breasts, begin having regular mammograms starting at age 40. This is a scan to check for breast cancer.  Colon cancer screening: It is important to start screening for colon cancer at age 45.  Have a colonoscopy test every 10 years (or more often if you're at risk) Or, ask your provider about stool tests like a FIT test every year or Cologuard test every 3 years.  To learn more about your testing options, visit:   .  For help making a decision, visit:   https://bit.ly/bc14700.  Prostate cancer screening test: If you have a prostate, ask your care team if a prostate cancer screening test (PSA) at age 55 is right for you.  Lung cancer screening: If you are a current or former smoker ages 50 to 80, ask your care team if ongoing lung cancer screenings are right for you.  For informational purposes only. Not to replace the advice of your health care provider. Copyright   2023 [employer]. All rights reserved. Clinically reviewed by the [facility] Transitions Program. [employer] [id number] - REV 01/24.

## 2024-12-31 NOTE — PROGRESS NOTES
"Preventive Care Visit  [facility]  Louis Colon PA-C, Family Medicine  Dec 31, 2024        Assessment & Plan       ICD-10-CM    1. Routine general medical examination at a health care facility  Z00.00       2. Coronary artery disease involving native heart with angina pectoris, unspecified vessel or lesion type (H)  I25.119 semaglutide-weight management (WEGOVY) 0.25 MG/0.5ML pen     Semaglutide-Weight Management (WEGOVY) 0.5 MG/0.5ML pen      3. Morbid obesity (H)  E66.01 semaglutide-weight management (WEGOVY) 0.25 MG/0.5ML pen     Semaglutide-Weight Management (WEGOVY) 0.5 MG/0.5ML pen     OFFICE/OUTPT VISIT,GREG LANCASTER III      Work on Healthy diet and exercise. Getting heart rate elevated for 30 mins most days of week.  Discussed the use of GLP-1's injectables warning signs side effects were discussed patient is interested in starting this.  This depend on insurance coverage if he starts the medication we will see him back in 3 months.      BMI  Estimated body mass index is 42.6 kg/m  as calculated from the following:    Height as of this encounter: 1.651 m (5' 5\").    Weight as of this encounter: 116.1 kg (256 lb).   Weight management plan: Discussed healthy diet and exercise guidelines    Counseling  Appropriate preventive services were addressed with this patient via screening, questionnaire, or discussion as appropriate for fall prevention, nutrition, physical activity, Tobacco-use cessation, social engagement, weight loss and cognition.  Checklist reviewing preventive services available has been given to the patient.  Reviewed patient's diet, addressing concerns and/or questions.   He is at risk for lack of exercise and has been provided with information to increase physical activity for the benefit of his well-being.       Chino Asher is a 54 year old, presenting for the following:  Physical       HPI      Vascular Disease Follow-up- see's cardiology    How often do you " take nitroglycerin? Never  Do you take an aspirin every day? Yes    Is interested in GLP1 for weight loss.       Health Care Directive  Patient does not have a Health Care Directive: Discussed advance care planning with patient; however, patient declined at this time.      12/29/2024   General Health   How would you rate your overall physical health? Good         12/29/2024   Nutrition   Three or more servings of calcium each day? Yes   Diet: Low salt    Low fat/cholesterol   How many servings of fruit and vegetables per day? (!) 0-1   How many sweetened beverages each day? 0-1       Multiple values from one day are sorted in reverse-chronological order         12/29/2024   Exercise   Days per week of moderate/strenous exercise 3 days   Average minutes spent exercising at this level 40 min         12/29/2024   Social Factors   Frequency of gathering with friends or relatives Once a week   Worry food won't last until get money to buy more No   Food not last or not have enough money for food? No   Do you have housing? (Housing is defined as stable permanent housing and does not include staying ouside in a car, in a tent, in an abandoned building, in an overnight shelter, or couch-surfing.) Yes   Are you worried about losing your housing? No   Lack of transportation? No   Unable to get utilities (heat,electricity)? No         12/29/2024   Fall Risk   Fallen 2 or more times in the past year? No   Trouble with walking or balance? No          12/29/2024   Dental   Dentist two times every year? Yes         12/29/2024   TB Screening   Were you born outside of the [place]? No         Today's PHQ-2 Score:       12/31/2024    10:31 AM   PHQ-2 ( 1999 [employer])   Q1: Little interest or pleasure in doing things 0   Q2: Feeling down, depressed or hopeless 0   PHQ-2 Score 0    Q1: Little interest or pleasure in doing things Not at all   Q2: Feeling down, depressed or hopeless Not at all   PHQ-2 Score 0       Patient-reported            "12/29/2024   Substance Use   Alcohol more than 3/day or more than 7/wk No   Do you use any other substances recreationally? No     Social History     Tobacco Use    Smoking status: Former     Current packs/day: 0.50     Average packs/day: 0.5 packs/day for 20.0 years (10.0 ttl pk-yrs)     Types: Cigarettes    Smokeless tobacco: Never   Vaping Use    Vaping status: Never Used   Substance Use Topics    Alcohol use: No    Drug use: No           12/29/2024   STI Screening   New sexual partner(s) since last STI/HIV test? No   Last PSA: No results found for: \"PSA\"  ASCVD Risk   The ASCVD Risk score (Neo OLIVER, et al., 2019) failed to calculate for the following reasons:    Risk score cannot be calculated because patient has a medical history suggesting prior/existing ASCVD         Reviewed and updated as needed this visit by Provider   Tobacco  Allergies  Meds  Problems  Med Hx  Surg Hx  Fam Hx            Past Medical History:   Diagnosis Date    CAD (coronary artery disease)     MI 2016    NO ACTIVE PROBLEMS      Past Surgical History:   Procedure Laterality Date    PVD STENTING  08/2016    [facility]C ANESTH,ARTHROSCOP PROC,ANKLE &/OR FOOT      right     Labs reviewed in EPIC  BP Readings from Last 3 Encounters:   12/31/24 136/72   10/12/24 (!) 152/90   09/30/24 130/84    Wt Readings from Last 3 Encounters:   12/31/24 116.1 kg (256 lb)   10/12/24 116.2 kg (256 lb 3.2 oz)   09/30/24 115.4 kg (254 lb 6.4 oz)                  Patient Active Problem List   Diagnosis    CARDIOVASCULAR SCREENING; LDL GOAL LESS THAN 160    Bilateral low back pain without sciatica    Coronary artery disease involving native heart with angina pectoris, unspecified vessel or lesion type (H)    Bilateral carpal tunnel syndrome    Skin tag    Erectile dysfunction, unspecified erectile dysfunction type    Psoriasis    Morbid obesity (H)    Ischemic cardiomyopathy    MIR (obstructive sleep apnea)    ST elevation (STEMI) myocardial infarction " involving left anterior descending coronary artery (H)     Past Surgical History:   Procedure Laterality Date    PVD STENTING  08/2016    [facility] ANESTH,ARTHROSCOP PROC,ANKLE &/OR FOOT      right       Social History     Tobacco Use    Smoking status: Former     Current packs/day: 0.50     Average packs/day: 0.5 packs/day for 20.0 years (10.0 ttl pk-yrs)     Types: Cigarettes    Smokeless tobacco: Never   Substance Use Topics    Alcohol use: No     Family History   Problem Relation Age of Onset    Diabetes Father     Kidney Disease Father          Current Outpatient Medications   Medication Sig Dispense Refill    clopidogrel (PLAVIX) 75 MG tablet Take 75 mg by mouth daily.      semaglutide-weight management (WEGOVY) 0.25 MG/0.5ML pen Inject 0.5 mLs (0.25 mg) subcutaneously once a week. 2 mL 0    Semaglutide-Weight Management (WEGOVY) 0.5 MG/0.5ML pen Inject 0.5 mg subcutaneously once a week. 6 mL 0    aspirin 81 MG chewable tablet 81 mg (Patient not taking: Reported on 10/12/2024)      atorvastatin (LIPITOR) 40 MG tablet       nitroGLYcerin (NITROSTAT) 0.4 MG sublingual tablet Place 0.4 mg under the tongue.      rosuvastatin (CRESTOR) 40 MG tablet Take 1 tablet by mouth daily.      sildenafil (REVATIO) 20 MG tablet TAKE 1-5 TABLETS BY MOUTH ONE HOUR PRIOR TO SEXUAL ACTIVITY. DO NOT TAKE WITH NITROGLYCERIN, TERAZOSIN OR DOXAZOSIN. 30 tablet 3    tadalafil (CIALIS) 10 MG tablet Take 1-2 tablets (10-20 mg) by mouth daily as needed (ED) 30 tablet 1     Allergies   Allergen Reactions    No Known Allergies      Recent Labs   Lab Test 08/12/24  1046 06/12/23  1413 07/24/21  1009 11/28/18  0000 05/22/18  1101 03/10/18  0000 07/20/17  1349   LDL 54 49 70  --   --    < >  --    HDL 41 46 38*  --   --    < >  --    TRIG 135 140 154*  --   --    < >  --    ALT  --  30  --   --   --   --   --    CR  --  0.81  --  0.96  --   --  0.85   GFRESTIMATED  --  >90  --  >60  --   --  >90  Non  GFR Calc     GFRESTBLACK  --    "--   --  >60  --   --  >90  African American GFR Calc     POTASSIUM  --  4.2  --  4.2  --   --  3.6   TSH  --   --   --   --  1.31  --   --     < > = values in this interval not displayed.          Review of Systems  Constitutional, HEENT, cardiovascular, pulmonary, gi and gu systems are negative, except as otherwise noted.     Objective    Exam  /72   Pulse 88   Temp (!) 96  F (35.6  C) (Tympanic)   Resp 16   Ht 1.651 m (5' 5\")   Wt 116.1 kg (256 lb)   SpO2 95%   BMI 42.60 kg/m     Estimated body mass index is 42.6 kg/m  as calculated from the following:    Height as of this encounter: 1.651 m (5' 5\").    Weight as of this encounter: 116.1 kg (256 lb).    Physical Exam  GENERAL: alert and no distress  EYES: Eyes grossly normal to inspection, PERRL and conjunctivae and sclerae normal  HENT: ear canals and TM's normal, nose and mouth without ulcers or lesions  NECK: no adenopathy, no asymmetry, masses, or scars  RESP: lungs clear to auscultation - no rales, rhonchi or wheezes  CV: regular rate and rhythm, normal S1 S2, no S3 or S4, no murmur, click or rub, no peripheral edema  ABDOMEN: soft, nontender, no hepatosplenomegaly, no masses and bowel sounds normal  MS: no gross musculoskeletal defects noted, no edema  SKIN: no suspicious lesions or rashes  NEURO: Normal strength and tone, mentation intact and speech normal  PSYCH: mentation appears normal, affect normal/bright        Signed Electronically by: Louis Colon PA-C    "

## 2025-02-19 ENCOUNTER — MYC MEDICAL ADVICE (OUTPATIENT)
Dept: FAMILY MEDICINE | Facility: CLINIC | Age: 55
End: 2025-02-19
Payer: COMMERCIAL

## 2025-02-19 DIAGNOSIS — I25.119 CORONARY ARTERY DISEASE INVOLVING NATIVE HEART WITH ANGINA PECTORIS, UNSPECIFIED VESSEL OR LESION TYPE: Primary | ICD-10-CM

## 2025-02-19 DIAGNOSIS — E66.01 MORBID OBESITY (H): ICD-10-CM

## 2025-03-27 ENCOUNTER — OFFICE VISIT (OUTPATIENT)
Dept: FAMILY MEDICINE | Facility: CLINIC | Age: 55
End: 2025-03-27
Payer: COMMERCIAL

## 2025-03-27 VITALS
SYSTOLIC BLOOD PRESSURE: 130 MMHG | TEMPERATURE: 96.7 F | RESPIRATION RATE: 16 BRPM | WEIGHT: 250 LBS | HEART RATE: 81 BPM | OXYGEN SATURATION: 95 % | DIASTOLIC BLOOD PRESSURE: 82 MMHG | BODY MASS INDEX: 41.65 KG/M2 | HEIGHT: 65 IN

## 2025-03-27 DIAGNOSIS — E66.01 MORBID OBESITY (H): ICD-10-CM

## 2025-03-27 RX ORDER — LOSARTAN POTASSIUM 25 MG/1
25 TABLET ORAL DAILY
COMMUNITY

## 2025-03-27 ASSESSMENT — PAIN SCALES - GENERAL: PAINLEVEL_OUTOF10: NO PAIN (0)

## 2025-03-27 NOTE — PROGRESS NOTES
"  Assessment & Plan       ICD-10-CM    1. Morbid obesity (H)  E66.01       Work on Healthy diet and exercise. Getting heart rate elevated for 30 mins most days of week.  medical conditions are stable. meds refilled.  Recheck 3 months. warning signs discussed.     The longitudinal plan of care for the diagnosis(es)/condition(s) as documented were addressed during this visit. Due to the added complexity in care, I will continue to support Lb in the subsequent management and with ongoing continuity of care.  Subjective   Lb is a 54 year old, presenting for the following health issues:  Recheck Medication        3/27/2025     2:39 PM   Additional Questions   Roomed by tita   Accompanied by self         3/27/2025     2:39 PM   Patient Reported Additional Medications   Patient reports taking the following new medications no     History of Present Illness       Reason for visit:  Wegovy Follow-up    He eats 2-3 servings of fruits and vegetables daily.He consumes 0 sweetened beverage(s) daily. He exercises with enough effort to increase his heart rate 4 days per week.   He is taking medications regularly.      Just started wegovy 1.7mg dosage yesterday.   Lost about 10lbs at this time.   He is working on exercise and healthier food choices.   Occ upset stomach.     Review of Systems  Constitutional, HEENT, cardiovascular, pulmonary, gi and gu systems are negative, except as otherwise noted.      Objective    /82   Pulse 81   Temp (!) 96.7  F (35.9  C) (Tympanic)   Resp 16   Ht 1.651 m (5' 5\")   Wt 113.4 kg (250 lb)   SpO2 95%   BMI 41.60 kg/m    Body mass index is 41.6 kg/m .  Physical Exam   GENERAL: alert and no distress  RESP: lungs clear to auscultation - no rales, rhonchi or wheezes  CV: regular rate and rhythm, normal S1 S2, no S3 or S4, no murmur, click or rub, no peripheral edema  MS: no gross musculoskeletal defects noted, no edema    Labs reviewed.         Signed Electronically by: Louis" Kenneth Colon PA-C

## 2025-04-19 ENCOUNTER — MYC MEDICAL ADVICE (OUTPATIENT)
Dept: FAMILY MEDICINE | Facility: CLINIC | Age: 55
End: 2025-04-19
Payer: COMMERCIAL

## 2025-04-19 DIAGNOSIS — I25.119 CORONARY ARTERY DISEASE INVOLVING NATIVE HEART WITH ANGINA PECTORIS, UNSPECIFIED VESSEL OR LESION TYPE: ICD-10-CM

## 2025-04-19 DIAGNOSIS — E66.01 MORBID OBESITY (H): Primary | ICD-10-CM

## 2025-06-11 DIAGNOSIS — I25.119 CORONARY ARTERY DISEASE INVOLVING NATIVE HEART WITH ANGINA PECTORIS, UNSPECIFIED VESSEL OR LESION TYPE: ICD-10-CM

## 2025-06-11 DIAGNOSIS — E66.01 MORBID OBESITY (H): ICD-10-CM

## 2025-06-11 RX ORDER — SEMAGLUTIDE 2.4 MG/.75ML
INJECTION, SOLUTION SUBCUTANEOUS
Qty: 3 ML | Refills: 0 | Status: SHIPPED | OUTPATIENT
Start: 2025-06-11

## 2025-06-26 ENCOUNTER — OFFICE VISIT (OUTPATIENT)
Dept: FAMILY MEDICINE | Facility: CLINIC | Age: 55
End: 2025-06-26
Payer: COMMERCIAL

## 2025-06-26 VITALS
HEIGHT: 65 IN | SYSTOLIC BLOOD PRESSURE: 125 MMHG | RESPIRATION RATE: 16 BRPM | OXYGEN SATURATION: 96 % | HEART RATE: 96 BPM | BODY MASS INDEX: 40.48 KG/M2 | WEIGHT: 243 LBS | TEMPERATURE: 96 F | DIASTOLIC BLOOD PRESSURE: 80 MMHG

## 2025-06-26 DIAGNOSIS — E66.01 MORBID OBESITY (H): ICD-10-CM

## 2025-06-26 DIAGNOSIS — I25.119 CORONARY ARTERY DISEASE INVOLVING NATIVE HEART WITH ANGINA PECTORIS, UNSPECIFIED VESSEL OR LESION TYPE: ICD-10-CM

## 2025-06-26 RX ORDER — SEMAGLUTIDE 2.4 MG/.75ML
2.4 INJECTION, SOLUTION SUBCUTANEOUS WEEKLY
Qty: 3 ML | Refills: 0 | Status: SHIPPED | OUTPATIENT
Start: 2025-06-26

## 2025-06-26 ASSESSMENT — PAIN SCALES - GENERAL: PAINLEVEL_OUTOF10: NO PAIN (0)

## 2025-06-26 NOTE — PROGRESS NOTES
"  Assessment & Plan       ICD-10-CM    1. Morbid obesity (H)  E66.01 Semaglutide-Weight Management (WEGOVY) 2.4 MG/0.75ML pen      2. Coronary artery disease involving native heart with angina pectoris, unspecified vessel or lesion type  I25.119 Semaglutide-Weight Management (WEGOVY) 2.4 MG/0.75ML pen      Talk to patient about his concerns.  Will continue Wegovy 2.4 mg weekly and he will monitor his weight over the next month.  If he appears to be plateauing out we will consider switching him to Zepbound.  Based on his insurance coverage.  Warning signs side effects were discussed.  Work on Healthy diet and exercise. Getting heart rate elevated for 30 mins most days of week.  Recheck 3 months.     BMI  Estimated body mass index is 40.44 kg/m  as calculated from the following:    Height as of this encounter: 1.651 m (5' 5\").    Weight as of this encounter: 110.2 kg (243 lb).   Weight management plan: Discussed healthy diet and exercise guidelines      Chino Asher is a 54 year old, presenting for the following health issues:  Recheck Medication    History of Present Illness       Reason for visit:  Wegovy Followup    He eats 0-1 servings of fruits and vegetables daily.He consumes 1 sweetened beverage(s) daily.He exercises with enough effort to increase his heart rate 20 to 29 minutes per day.  He exercises with enough effort to increase his heart rate 3 or less days per week.   He is taking medications regularly.      Patient is doing a follow-up for being on Wegovy 2.4 mg weekly.  He is tolerating medication well with no side effects.  Since being on the medication he states he is lost about 15 pounds.  He does feel like the weight loss has slowed down.    Review of Systems  Constitutional, HEENT, cardiovascular, pulmonary, gi and gu systems are negative, except as otherwise noted.      Objective    /80   Pulse 96   Temp (!) 96  F (35.6  C) (Tympanic)   Resp 16   Ht 1.651 m (5' 5\")   Wt 110.2 kg " (243 lb)   SpO2 96%   BMI 40.44 kg/m    Body mass index is 40.44 kg/m .  Physical Exam   GENERAL: alert and no distress  PSYCH: mentation appears normal, affect normal/bright            Signed Electronically by: Louis Colon PA-C

## 2025-08-12 ENCOUNTER — TELEPHONE (OUTPATIENT)
Dept: FAMILY MEDICINE | Facility: CLINIC | Age: 55
End: 2025-08-12

## 2025-08-12 ENCOUNTER — OFFICE VISIT (OUTPATIENT)
Dept: FAMILY MEDICINE | Facility: CLINIC | Age: 55
End: 2025-08-12
Payer: COMMERCIAL

## 2025-08-12 VITALS
HEART RATE: 76 BPM | BODY MASS INDEX: 40.48 KG/M2 | SYSTOLIC BLOOD PRESSURE: 138 MMHG | OXYGEN SATURATION: 96 % | TEMPERATURE: 97 F | RESPIRATION RATE: 16 BRPM | DIASTOLIC BLOOD PRESSURE: 82 MMHG | WEIGHT: 243 LBS | HEIGHT: 65 IN

## 2025-08-12 DIAGNOSIS — E66.01 MORBID OBESITY (H): Primary | ICD-10-CM

## 2025-08-12 DIAGNOSIS — I25.119 CORONARY ARTERY DISEASE INVOLVING NATIVE HEART WITH ANGINA PECTORIS, UNSPECIFIED VESSEL OR LESION TYPE: ICD-10-CM

## 2025-08-12 PROCEDURE — 3075F SYST BP GE 130 - 139MM HG: CPT | Performed by: PHYSICIAN ASSISTANT

## 2025-08-12 PROCEDURE — G2211 COMPLEX E/M VISIT ADD ON: HCPCS | Performed by: PHYSICIAN ASSISTANT

## 2025-08-12 PROCEDURE — 99213 OFFICE O/P EST LOW 20 MIN: CPT | Performed by: PHYSICIAN ASSISTANT

## 2025-08-12 PROCEDURE — 1126F AMNT PAIN NOTED NONE PRSNT: CPT | Performed by: PHYSICIAN ASSISTANT

## 2025-08-12 PROCEDURE — 3079F DIAST BP 80-89 MM HG: CPT | Performed by: PHYSICIAN ASSISTANT

## 2025-08-12 ASSESSMENT — PAIN SCALES - GENERAL: PAINLEVEL_OUTOF10: NO PAIN (0)
